# Patient Record
Sex: FEMALE | Race: BLACK OR AFRICAN AMERICAN
[De-identification: names, ages, dates, MRNs, and addresses within clinical notes are randomized per-mention and may not be internally consistent; named-entity substitution may affect disease eponyms.]

---

## 2017-05-26 ENCOUNTER — HOSPITAL ENCOUNTER (EMERGENCY)
Dept: HOSPITAL 62 - ER | Age: 22
Discharge: HOME | End: 2017-05-26
Payer: MEDICAID

## 2017-05-26 VITALS — DIASTOLIC BLOOD PRESSURE: 67 MMHG | SYSTOLIC BLOOD PRESSURE: 110 MMHG

## 2017-05-26 DIAGNOSIS — R51: ICD-10-CM

## 2017-05-26 DIAGNOSIS — Z3A.10: ICD-10-CM

## 2017-05-26 DIAGNOSIS — R07.9: ICD-10-CM

## 2017-05-26 DIAGNOSIS — O21.0: Primary | ICD-10-CM

## 2017-05-26 LAB
ALBUMIN SERPL-MCNC: 4.2 G/DL (ref 3.5–5)
ALP SERPL-CCNC: 99 U/L (ref 38–126)
ALT SERPL-CCNC: 44 U/L (ref 9–52)
ANION GAP SERPL CALC-SCNC: 16 MMOL/L (ref 5–19)
APPEARANCE UR: (no result)
AST SERPL-CCNC: 31 U/L (ref 14–36)
BASOPHILS # BLD AUTO: 0 10^3/UL (ref 0–0.2)
BASOPHILS NFR BLD AUTO: 0.6 % (ref 0–2)
BILIRUB DIRECT SERPL-MCNC: 0.4 MG/DL (ref 0–0.4)
BILIRUB SERPL-MCNC: 0.5 MG/DL (ref 0.2–1.3)
BILIRUB UR QL STRIP: NEGATIVE
BUN SERPL-MCNC: 7 MG/DL (ref 7–20)
CALCIUM: 10.3 MG/DL (ref 8.4–10.2)
CHLORIDE SERPL-SCNC: 102 MMOL/L (ref 98–107)
CO2 SERPL-SCNC: 23 MMOL/L (ref 22–30)
CREAT SERPL-MCNC: 0.64 MG/DL (ref 0.52–1.25)
EOSINOPHIL # BLD AUTO: 0 10^3/UL (ref 0–0.6)
EOSINOPHIL NFR BLD AUTO: 0.4 % (ref 0–6)
ERYTHROCYTE [DISTWIDTH] IN BLOOD BY AUTOMATED COUNT: 13 % (ref 11.5–14)
GLUCOSE SERPL-MCNC: 94 MG/DL (ref 75–110)
GLUCOSE UR STRIP-MCNC: NEGATIVE MG/DL
HCT VFR BLD CALC: 39.7 % (ref 36–47)
HGB BLD-MCNC: 13.2 G/DL (ref 12–15.5)
HGB HCT DIFFERENCE: -0.1
KETONES UR STRIP-MCNC: 20 MG/DL
LIPASE SERPL-CCNC: 254.2 U/L (ref 23–300)
LYMPHOCYTES # BLD AUTO: 1.2 10^3/UL (ref 0.5–4.7)
LYMPHOCYTES NFR BLD AUTO: 19.7 % (ref 13–45)
MCH RBC QN AUTO: 28.6 PG (ref 27–33.4)
MCHC RBC AUTO-ENTMCNC: 33.2 G/DL (ref 32–36)
MCV RBC AUTO: 86 FL (ref 80–97)
MONOCYTES # BLD AUTO: 0.4 10^3/UL (ref 0.1–1.4)
MONOCYTES NFR BLD AUTO: 6.8 % (ref 3–13)
NEUTROPHILS # BLD AUTO: 4.3 10^3/UL (ref 1.7–8.2)
NEUTS SEG NFR BLD AUTO: 72.5 % (ref 42–78)
NITRITE UR QL STRIP: NEGATIVE
PH UR STRIP: 5 [PH] (ref 5–9)
POTASSIUM SERPL-SCNC: 3.9 MMOL/L (ref 3.6–5)
PROT SERPL-MCNC: 8 G/DL (ref 6.3–8.2)
PROT UR STRIP-MCNC: NEGATIVE MG/DL
RBC # BLD AUTO: 4.6 10^6/UL (ref 3.72–5.28)
SODIUM SERPL-SCNC: 140.5 MMOL/L (ref 137–145)
SP GR UR STRIP: 1.02
UROBILINOGEN UR-MCNC: 2 MG/DL (ref ?–2)
WBC # BLD AUTO: 6 10^3/UL (ref 4–10.5)

## 2017-05-26 PROCEDURE — 99285 EMERGENCY DEPT VISIT HI MDM: CPT

## 2017-05-26 PROCEDURE — S0119 ONDANSETRON 4 MG: HCPCS

## 2017-05-26 PROCEDURE — 84702 CHORIONIC GONADOTROPIN TEST: CPT

## 2017-05-26 PROCEDURE — 93010 ELECTROCARDIOGRAM REPORT: CPT

## 2017-05-26 PROCEDURE — 96360 HYDRATION IV INFUSION INIT: CPT

## 2017-05-26 PROCEDURE — 80053 COMPREHEN METABOLIC PANEL: CPT

## 2017-05-26 PROCEDURE — 87086 URINE CULTURE/COLONY COUNT: CPT

## 2017-05-26 PROCEDURE — 36415 COLL VENOUS BLD VENIPUNCTURE: CPT

## 2017-05-26 PROCEDURE — 85025 COMPLETE CBC W/AUTO DIFF WBC: CPT

## 2017-05-26 PROCEDURE — 83690 ASSAY OF LIPASE: CPT

## 2017-05-26 PROCEDURE — 81001 URINALYSIS AUTO W/SCOPE: CPT

## 2017-05-26 PROCEDURE — 93005 ELECTROCARDIOGRAM TRACING: CPT

## 2017-05-26 NOTE — EKG REPORT
SEVERITY:- BORDERLINE ECG -

SINUS TACHYCARDIA

POOR R  PROGRESSION ANTERIOR PRECORDIAL LEADS.

:

Confirmed by: Leander Haji MD 26-May-2017 20:34:14

## 2017-05-26 NOTE — ER DOCUMENT REPORT
ED GI/





- General


Chief Complaint: Chest Pain


Stated Complaint: CHEST PAIN/VOMITING


Time Seen by Provider: 17 14:04


Mode of Arrival: Ambulatory


Notes: 


The patient is a 21-year-old female,  10 weeks pregnant, presents with 

daily nausea and vomiting.  She is trying Phenergan, Reglan and MagOxide 

without much relief of her symptoms.  She is now having a dull mild headache 

and a burning in her substernal region.  Denies hematemesis, fevers, diarrhea, 

abdominal pain, leakage of fluid, vaginal bleeding, constipation or rash





- Related Data


Allergies/Adverse Reactions: 


 





No Known Allergies Allergy (Unverified 17 13:28)


 











Past Medical History





- General


Information source: Patient





- Social History


Smoking Status: Never Smoker


Chew tobacco use (# tins/day): No


Frequency of alcohol use: None


Drug Abuse: None


Family History: Reviewed & Not Pertinent


Patient has suicidal ideation: No


Patient has homicidal ideation: No


Renal/ Medical History: Denies: Hx Peritoneal Dialysis


Surgical Hx: Negative





- Immunizations


Hx Diphtheria, Pertussis, Tetanus Vaccination: No - unknown





Review of Systems





- Review of Systems


Notes: 


REVIEW OF SYSTEMS:


CONSTITUTIONAL: -fevers, -chills


EENT: -eye pain, -difficulty swallowing, -nasal congestion


CARDIOVASCULAR:-chest pain, -syncope.


RESPIRATORY: -cough, -SOB


GASTROINTESTINAL:  -abdominal pain, +nausea, +vomiting, -diarrhea


GENITOURINARY: -dysuria, -hematuria


MUSCULOSKELETAL: -back pain, -neck pain


SKIN: -rash or skin lesions.


HEMATOLOGIC: -easy bruising or bleeding.


LYMPHATIC: -swollen, enlarged glands.


NEUROLOGICAL: -altered mental status or loss of consciousness, -headache, -

neurologic symptoms


PSYCHIATRIC: -anxiety, -depression.


ALL OTHER SYSTEMS REVIEWED AND NEGATIVE.





Physical Exam





- Vital signs


Vitals: 


 











Temp Pulse Resp BP Pulse Ox


 


 98.5 F   108 H  16   124/85   97 


 


 17 13:26  17 13:26  17 13:26  17 13:17 13:26














- Notes


Notes: 


PHYSICAL EXAMINATION:





GENERAL: Well-appearing, well-nourished and in no acute distress.





HEAD: Atraumatic, normocephalic.





EYES: Pupils equal round and reactive to light, extraocular movements intact, 

sclera anicteric, conjunctiva are normal.





ENT: nares patent, oropharynx clear without exudates.  Moist mucous membranes.





NECK: Normal range of motion, supple without lymphadenopathy





LUNGS: Breath sounds clear to auscultation bilaterally and equal.  No wheezes 

rales or rhonchi.





HEART: Regular rate and rhythm without murmurs





ABDOMEN: Soft, nontender, normoactive bowel sounds.  No guarding, no rebound.  

No masses appreciated.





EXTREMITIES: Normal range of motion, no pitting or edema.  No cyanosis.





NEUROLOGICAL: Cranial nerves grossly intact.  Normal speech, normal gait.  

Normal sensory and motor exams.





PSYCH: Normal mood, normal affect.





SKIN: Warm, Dry, normal turgor, no rashes or lesions noted.





Course





- Re-evaluation


Re-evalutation: 


Patient appears well.  Has a small amount of ketones in urine, but her BMP is 

completely normal. No increased anion gap.  His abdomen is soft and nontender.  

Her tachycardia resolved with fluids.  EKG did not show any active ischemia. Pt 

does not appear to have Boerhavve's (appears well and no current chest pain, ACS

, PE (no SOB or hypoxia and resolution of tachycardia with fluids) or 

pneumothorax. Will add doxylamine and have her follow-up with her OB.





- Vital Signs


Vital signs: 


 











Temp Pulse Resp BP Pulse Ox


 


 98.5 F   108 H  16   124/85   97 


 


 17 13:26  17 13:26  17 13:26  17 13:26  17 13:26














- Laboratory


Result Diagrams: 


 17 14:20





 17 14:20


Laboratory results interpreted by me: 


 











  17





  14:20 14:20


 


Calcium  10.3 H 


 


Urine Ketones   20 H


 


Urine Urobilinogen   2.0 H


 


Urine Ascorbic Acid   40 H














- EKG Interpretation by Me


EKG shows normal: Sinus rhythm, Axis, Intervals, QRS Complexes, ST-T Waves


Rate: Tachycardia - 101





Discharge





- Discharge


Clinical Impression: 


 Hyperemesis gravidarum





Condition: Stable


Disposition: HOME, SELF-CARE


Additional Instructions: 


VOMITING:





     Vomiting (or nausea without vomiting) can be caused by many other 

different problems. It can mean that something's wrong with the stomach, such 

as ulcers or inflammation or the intestinal tract, such as appendicitis.  But 

it can also be a symptom of a problem that has nothing to do with the stomach 

or intestines. Vomiting is common with severe headaches, earaches, tonsillitis, 

and kidney infections, etc. We see it with pneumonia or heart attacks. Drugs 

can cause nausea and vomiting. Many abdominal problems cause vomiting; for 

example, gallstones, kidney stones, pancreatitis, and intestinal obstruction (

blocked bowels).


     In most cases, curing the vomiting depends on fixing the problem that 

caused it. For temporary relief, we may use an anti-nausea medicine. For home 

use, we can prescribe suppositories, chewable pills, pills that dissolve in the 

mouth, or liquid anti-nausea drugs. If the vomiting seems to be caused by a 

problem in the stomach, acid-suppressing drugs may be prescribed as well.


     It's important to avoid dehydration. Sip small amounts of clear liquids (

soft drinks, tea, broth, etc) . Try to take fluids frequently even if you are 

vomiting to prevent dehydration.  Take increasing amounts of fluid and when 

liquids are being consumed successfully, advance to small amounts of bland food 

(toast, soups, mashed potatoes, etc.) until you are able to resume a regular 

diet.  Avoid aspirin, tobacco, and alcohol.


     If the vomiting worsens, if the problem that's making you vomit worsens, 

or if there's evidence of bleeding in the stomach (such as black, tarry stool, 

or bloody or black vomit), you should return immediately. Also, return if 

abdominal pain worsens or becomes localized to one area or you develop high 

fever.  Call your doctor if you aren't improved in 24 hours.





INTRAVENOUS (I V) FLUIDS:


     As part of your care today, you received intravenous (IV) fluids.  IV 

fluids are administered to patients who are dehydrated or to those who have 

certain chemical (electrolyte) abnormalities that need correcting.








[place] MEDICATION:


     You have been given a medication to suppress nausea and vomiting. This 

type of medication can be given as a shot, pill, or suppository. It will 

usually last for many hours.  Pills and shots usually last six to eight hours.  

For the typical illness, only one or two doses of the medication may be 

necessary.


     Mild lightheadedness may occur.  This type of medicine can cause 

drowsiness.  Do not drive or operate dangerous machinery while under its 

influence.  Do not mix with alcohol.


     See your doctor at once if you have muscle spasms or tightness, or 

uncontrollable motions (particularly of the neck, mouth, or jaw). Persistent 

vomiting or severe lightheadedness should also be evaluated by the physician.








REGLAN (METOCLOPRAMIDE):


     Reglan has been prescribed.  This medicine affects the stomach and 

intestines.  It can be used to treat nausea and vomiting, to prevent reflux of 

stomach acid up into the esophagus, or to increase the contractions of the 

stomach and intestines.  It is often prescribed for esophagitis, and for 

paralysis of the stomach in diabetics.


     Reglan can cause either mild restlessness or drowsiness.  You should 

contact the doctor at once if you become extremely restless, anxious, or cannot 

sleep, or if you develop uncontrollable motions of the lips, tongue, or jaw.


     Do not take alcohol with this medicine.  Do not drive or operate machinery 

until you have been taking this medicine long enough to know how it affects you.


     Call the doctor if you develop abdominal pains, lightheadedness, black 

stool, or blood in the stool or vomitus.





FOLLOW-UP CARE:


If you have been referred to a physician for follow-up care, call the physician

s office for an appointment as you were instructed or within the next two days.

  If you experience worsening or a significant change in your symptoms, notify 

the physician immediately or return to the Emergency Department at any time for 

re-evaluation.


Prescriptions: 


Doxycycline Hyclate 100 mg PO BID #30 capsule

## 2017-05-26 NOTE — ER DOCUMENT REPORT
ED Medical Screen (RME)





- General


Chief Complaint: Chest Pain


Stated Complaint: CHEST PAIN/VOMITING


Time Seen by Provider: 17 14:04


Mode of Arrival: Ambulatory


Information source: Patient





- HPI


Patient complains to provider of: nausea and vomiting, pregnant


Onset: Last week


Onset/Duration: Persistent


Quality of pain: Achy


Severity: Mild


Pain Level: 1


Associated Symptoms: Nausea, Vomiting


Exacerbated by: Food


Relieved by: Denies


Similar symptoms previously: Yes


Recently seen / treated by doctor: Yes


Notes: 





17 14:45


Patient is a 21-year-old female  who is currently approximately 10 weeks 

pregnant, who presents to the emergency room complaining of nausea and vomiting 

that been occurring daily, she has been prescribed both Phenergan and Reglan 

but the vomiting has persisted, states anytime she tries to eat something it 

comes right back up, she was having some chest pain earlier today but that has 

completely resolved, she denies a fever, no urinary symptoms, no vaginal 

bleeding and no pelvic pain





- Related Data


Allergies/Adverse Reactions: 


 





No Known Allergies Allergy (Unverified 17 13:28)


 











Past Medical History





- Social History


Chew tobacco use (# tins/day): No


Frequency of alcohol use: None


Drug Abuse: None


Renal/ Medical History: Denies: Hx Peritoneal Dialysis


Surgical Hx: Negative





- Immunizations


Hx Diphtheria, Pertussis, Tetanus Vaccination: No - unknown





Physical Exam





- Vital signs


Vitals: 


 











Temp Pulse Resp BP Pulse Ox


 


 98.5 F   108 H  16   124/85   97 


 


 17 13:26  17 13:26  17 13:26  17 13:26  17 13:26














Course





- Vital Signs


Vital signs: 


 











Temp Pulse Resp BP Pulse Ox


 


 98.5 F   108 H  16   124/85   97 


 


 17 13:26  17 13:26  17 13:26  17 13:26  17 13:26














- Laboratory


Result Diagrams: 


 17 14:20





 17 14:20

## 2017-05-31 ENCOUNTER — HOSPITAL ENCOUNTER (EMERGENCY)
Dept: HOSPITAL 62 - ER | Age: 22
Discharge: HOME | End: 2017-05-31
Payer: MEDICAID

## 2017-05-31 VITALS — DIASTOLIC BLOOD PRESSURE: 67 MMHG | SYSTOLIC BLOOD PRESSURE: 118 MMHG

## 2017-05-31 DIAGNOSIS — Z3A.00: ICD-10-CM

## 2017-05-31 DIAGNOSIS — O26.899: ICD-10-CM

## 2017-05-31 DIAGNOSIS — O21.0: Primary | ICD-10-CM

## 2017-05-31 DIAGNOSIS — R51: ICD-10-CM

## 2017-05-31 LAB
ANION GAP SERPL CALC-SCNC: 13 MMOL/L (ref 5–19)
APPEARANCE UR: CLEAR
BASOPHILS # BLD AUTO: 0 10^3/UL (ref 0–0.2)
BASOPHILS NFR BLD AUTO: 0.8 % (ref 0–2)
BILIRUB UR QL STRIP: NEGATIVE
BUN SERPL-MCNC: 8 MG/DL (ref 7–20)
CALCIUM: 10.6 MG/DL (ref 8.4–10.2)
CHLORIDE SERPL-SCNC: 104 MMOL/L (ref 98–107)
CO2 SERPL-SCNC: 21 MMOL/L (ref 22–30)
CREAT SERPL-MCNC: 0.55 MG/DL (ref 0.52–1.25)
EOSINOPHIL # BLD AUTO: 0 10^3/UL (ref 0–0.6)
EOSINOPHIL NFR BLD AUTO: 0.3 % (ref 0–6)
ERYTHROCYTE [DISTWIDTH] IN BLOOD BY AUTOMATED COUNT: 12.6 % (ref 11.5–14)
GLUCOSE SERPL-MCNC: 100 MG/DL (ref 75–110)
GLUCOSE UR STRIP-MCNC: >=500 MG/DL
HCT VFR BLD CALC: 39.5 % (ref 36–47)
HGB BLD-MCNC: 13.3 G/DL (ref 12–15.5)
HGB HCT DIFFERENCE: 0.4
KETONES UR STRIP-MCNC: 80 MG/DL
LYMPHOCYTES # BLD AUTO: 1.1 10^3/UL (ref 0.5–4.7)
LYMPHOCYTES NFR BLD AUTO: 20.9 % (ref 13–45)
MCH RBC QN AUTO: 28.7 PG (ref 27–33.4)
MCHC RBC AUTO-ENTMCNC: 33.8 G/DL (ref 32–36)
MCV RBC AUTO: 85 FL (ref 80–97)
MONOCYTES # BLD AUTO: 0.4 10^3/UL (ref 0.1–1.4)
MONOCYTES NFR BLD AUTO: 7.7 % (ref 3–13)
NEUTROPHILS # BLD AUTO: 3.8 10^3/UL (ref 1.7–8.2)
NEUTS SEG NFR BLD AUTO: 70.3 % (ref 42–78)
NITRITE UR QL STRIP: NEGATIVE
PH UR STRIP: 6 [PH] (ref 5–9)
POTASSIUM SERPL-SCNC: 4 MMOL/L (ref 3.6–5)
PROT UR STRIP-MCNC: NEGATIVE MG/DL
RBC # BLD AUTO: 4.64 10^6/UL (ref 3.72–5.28)
SODIUM SERPL-SCNC: 138.4 MMOL/L (ref 137–145)
SP GR UR STRIP: 1.01
UROBILINOGEN UR-MCNC: NEGATIVE MG/DL (ref ?–2)
WBC # BLD AUTO: 5.5 10^3/UL (ref 4–10.5)

## 2017-05-31 PROCEDURE — 85025 COMPLETE CBC W/AUTO DIFF WBC: CPT

## 2017-05-31 PROCEDURE — 99284 EMERGENCY DEPT VISIT MOD MDM: CPT

## 2017-05-31 PROCEDURE — 36415 COLL VENOUS BLD VENIPUNCTURE: CPT

## 2017-05-31 PROCEDURE — 96375 TX/PRO/DX INJ NEW DRUG ADDON: CPT

## 2017-05-31 PROCEDURE — 96361 HYDRATE IV INFUSION ADD-ON: CPT

## 2017-05-31 PROCEDURE — 81001 URINALYSIS AUTO W/SCOPE: CPT

## 2017-05-31 PROCEDURE — 96374 THER/PROPH/DIAG INJ IV PUSH: CPT

## 2017-05-31 PROCEDURE — 80048 BASIC METABOLIC PNL TOTAL CA: CPT

## 2017-05-31 NOTE — ER DOCUMENT REPORT
ED General





- General


Mode of Arrival: Ambulatory


Information source: Patient


TRAVEL OUTSIDE OF THE [place] IN LAST 30 DAYS: No





- HPI


Onset: Other


Similar symptoms previously: No


Recently seen / treated by doctor: No





<LOU CARY - Last Filed: 17 10:59>





<DANY GALEAS - Last Filed: 17 16:23>





- General


Chief Complaint: Headache


Stated Complaint: HEADACHE/VOMITING/SHORTNESS OF BREATH


Time Seen by Provider: 17 10:10


Notes: 


Patient is a 21 year old pregnant female presenting to the emergency department 

for nausea and vomiting. Patient is 10-11 weeks pregnant and patient is . 

[facility]. Patient 

was seen on 17 for these symptoms and states she was feeling better for 

about 1 day after being discharged and then she had symptoms start back again. 

Patient has nausea, vomiting, and a right sided temporal headache. Patient was 

trying Reglan, Phenergan and mag citrate at home with no relief.  Patient 

denies any fevers, chills, abdominal pain, dysuria, or vaginal bleeding. Patient

's last ate pizza yesterday and had a normal bowel movement yesterday as well. 

Patient's next OB appointment is on 17. Patient has no known medical 

allergies. (LOU CARY)





- Related Data


Allergies/Adverse Reactions: 


 





No Known Allergies Allergy (Unverified 17 10:00)


 








Home Medications: 


 Current Home Medications





Magnesium Oxide [Mag-Ox 400 mg Tablet] 400 mg PO DAILY 17 [History]


Metoclopramide HCl [Reglan 10 mg Tablet] 10 mg PO Q6H 17 [History]


Pnv7/Fe Asp Gly/Docusate/FA [Vinate Pn Care Tablet] 1 each PO DAILY 17 [

History]











Past Medical History





- General


Information source: Patient





- Social History


Smoking Status: Never Smoker


Cigarette use (# per day): No


Chew tobacco use (# tins/day): No


Frequency of alcohol use: None


Drug Abuse: None


Family History: None


Patient has suicidal ideation: No


Patient has homicidal ideation: No





- Medical History


Medical History: Negative


Surgical Hx: Negative





- Immunizations


Hx Diphtheria, Pertussis, Tetanus Vaccination: No - unknown





<LOU CARY - Last Filed: 17 10:59>





Review of Systems





- Review of Systems


Constitutional: No symptoms reported


EENT: No symptoms reported


Cardiovascular: No symptoms reported


Respiratory: No symptoms reported


Gastrointestinal: See HPI, Nausea, Vomiting


Genitourinary: No symptoms reported


Female Genitourinary: See HPI, Pregnant


Musculoskeletal: No symptoms reported


Skin: No symptoms reported


Hematologic/Lymphatic: No symptoms reported


Neurological/Psychological: See HPI, Headaches


-: Yes All other systems reviewed and negative





<LOU CARY - Last Filed: 17 10:59>





Physical Exam





- Vital signs


Interpretation: Normal





- General


General appearance: Appears well, Alert


In distress: Mild





- HEENT


Head: Normocephalic, Atraumatic


Eyes: Normal


Pupils: PERRL


Mucous membranes: Moist





- Respiratory


Respiratory status: No respiratory distress


Chest status: Nontender


Breath sounds: Normal


Chest palpation: Normal





- Cardiovascular


Rhythm: Regular


Heart sounds: Normal auscultation


Murmur: No





- Abdominal


Inspection: Gravid female


Distension: No distension


Bowel sounds: Normal


Tenderness: Nontender


Organomegaly: No organomegaly





- Back


Back: Normal, Nontender





- Extremities


General upper extremity: Normal inspection, Normal ROM, Normal strength


General lower extremity: Normal inspection, Normal ROM, Normal strength





- Neurological


Neuro grossly intact: Yes


Cognition: Normal


Orientation: AAOx4


[place] Coma Scale Eye Opening: Spontaneous


[place] Coma Scale Verbal: Oriented


[place] Coma Scale Motor: Obeys Commands


[place] Coma Scale Total: 15


Speech: Normal





- Psychological


Associated symptoms: Normal affect, Normal mood





- Skin


Skin Temperature: Warm


Skin Moisture: Dry





<BRENNAMILLALOU - Last Filed: 17 10:59>





<DANY GALEAS - Last Filed: 17 16:23>





- Vital signs


Vitals: 


 











Temp Pulse Resp BP Pulse Ox


 


 98.3 F   104 H  18   120/87 H  96 


 


 17 09:55  17 09:55  17 09:55  17 09:55  17 09:55














Course





<LOU CARY - Last Filed: 17 10:59>





- Laboratory


Result Diagrams: 


 17 10:45





 17 10:45





<DANY GALEAS - Last Filed: 17 16:23>





- Re-evaluation


Re-evalutation: 


17 14:21


Patient is actively vomiting liquid at this time.





17 16:19


The patient's nausea was improved quite a bit with Reglan and Benadryl.  She 

previously had reported that Reglan did not seem to help.  She has never tried 

taking it with Benadryl.


He was given a printout of the diplegia's dosing instructions, and written 

instructions on how to make a similar generic version with over-the-counter 

medications. (DANY GALEAS)





- Vital Signs


Vital signs: 


 











Temp Pulse Resp BP Pulse Ox


 


 98.3 F   76   16   112/78   100 


 


 17 09:55  17 11:35  17 11:35  17 14:29  17 14:30














- Laboratory


Laboratory results interpreted by me: 


 











  17





  10:45 14:00


 


Carbon Dioxide  21 L 


 


Calcium  10.6 H 


 


Urine Glucose (UA)   >=500 H


 


Urine Ketones   80 H














Discharge





<LOU CARY - Last Filed: 17 10:59>





<DANY GALEAS - Last Filed: 17 16:23>





- Discharge


Clinical Impression: 


 Hyperemesis gravidarum





Condition: Stable


Disposition: HOME, SELF-CARE


Additional Instructions: 


Hyperemesis Gravidarum:





     Hyperemesis gravidarum is the medical term for severe vomiting during 

pregnancy.  We don't know exactly why it occurs, but it's a common problem.


     Dehydration can occur.  This reduces blood flow to the placenta, 

decreasing the baby's nourishment.  The baby will also become dehydrated.  

There can be harmful changes in blood sodium, potassium, or acid balance.


     Our goal is to correct, and prevent, dehydration.  For severe cases, we 

give IV fluids.  Antinausea medication will be prescribed. (Don't be concerned 

about "birth defects" -- the risk to you and your baby from the hyperemesis is 

the biggest problem.  The antinausea medication is very safe at this stage of 

pregnancy.)


     Call the doctor if you have vaginal bleeding, abdominal pain, severe 

lightheadedness or weakness, or other alarming symptoms.








TAKE THE REGLAN WITH A BENADRYL TABLET.


DRINK SMALL SIPS OF COOL CLEAR LIQUIDS.


TRY THE OTC DICLEGIS AS SHOWN.


FOLLOW UP WITH [facility] OR THE HEALTH DEPARTMENT IF NOT 

IMPROVING.





RETURN TO THE EMERGENCY ROOM IF ANY NEW OR WORSENING SYMPTOMS.








Prescriptions: 


Metoclopramide HCl [Reglan 10 mg Tablet] 10 mg PO Q4 PRN #25 tablet


 PRN Reason: For Nausea/Vomiting


Scribe Attestation: 





17 16:23


I personally performed the services described in the documentation, reviewed 

and edited the documentation which was dictated to the scribe in my presence, 

and it accurately records my words and actions. (DANY GALEAS)





Scribe Documentation





- Scribe


Written by Monik:: Monik Chavez, 17 10:58


acting as scribe for :: Mabel





<LOU CARY - Last Filed: 17 10:59>

## 2017-05-31 NOTE — ER DOCUMENT REPORT
ED Medical Screen (RME)





- General


Chief Complaint: Headache


Stated Complaint: HEADACHE/VOMITING/SHORTNESS OF BREATH


Time Seen by Provider: 17 10:10


Mode of Arrival: Ambulatory


Information source: Patient


Notes: 


This is a 21-year-old female  at 10-11 WGA who presents with persistent 

nausea and vomiting.  She also has a mild right-sided temporal headache today.  

No fevers or chills.  No abdominal pain.  No dysuria.  No vaginal bleeding.  

She was seen about 5 days ago with similar symptoms and states that she was 

better for a day but now she is having difficulty with the vomiting again.  She 

last ate yesterday (pizza).  Normal BM yesterday.  Next OB appt .  She has 

had an US this pregnancy.








I have greeted and performed a rapid initial assessment of this patient.  A 

comprehensive ED assessment and evaluation of the patient, analysis of test 

results and completion of the medical decision making process will be conducted 

by additional ED providers.


TRAVEL OUTSIDE OF THE [place] IN LAST 30 DAYS: No





- Related Data


Allergies/Adverse Reactions: 


 





No Known Allergies Allergy (Unverified 17 10:00)


 








Home Medications: 


 Current Home Medications





Magnesium Oxide [Mag-Ox 400 mg Tablet] 400 mg PO DAILY 17 [History]


Metoclopramide HCl [Reglan 10 mg Tablet] 10 mg PO Q6H 17 [History]


Pnv7/Fe Asp Gly/Docusate/FA [Vinate Pn Care Tablet] 1 each PO DAILY 17 [

History]











Past Medical History


Renal/ Medical History: Denies: Hx Peritoneal Dialysis





- Immunizations


Hx Diphtheria, Pertussis, Tetanus Vaccination: No - unknown





Physical Exam





- Vital signs


Vitals: 





 











Temp Pulse Resp BP Pulse Ox


 


 98.3 F   104 H  18   120/87 H  96 


 


 17 09:55  17 09:55  17 09:55  17 09:55  17 09:55














- General


General appearance: Appears well


In distress: None





- Respiratory


Respiratory status: No respiratory distress


Breath sounds: Normal.  No: Rales, Rhonchi, Wheezing





- Cardiovascular


Rhythm: Tachycardia


Heart sounds: Normal auscultation, S1 appreciated, S2 appreciated





Course





- Vital Signs


Vital signs: 





 











Temp Pulse Resp BP Pulse Ox


 


 98.3 F   104 H  18   120/87 H  96 


 


 17 09:55  17 09:55  17 09:55  17 09:55  17 09:55

## 2017-06-12 ENCOUNTER — HOSPITAL ENCOUNTER (EMERGENCY)
Dept: HOSPITAL 62 - ER | Age: 22
Discharge: HOME | End: 2017-06-12
Payer: MEDICAID

## 2017-06-12 VITALS — DIASTOLIC BLOOD PRESSURE: 78 MMHG | SYSTOLIC BLOOD PRESSURE: 131 MMHG

## 2017-06-12 DIAGNOSIS — O21.9: Primary | ICD-10-CM

## 2017-06-12 DIAGNOSIS — Z79.899: ICD-10-CM

## 2017-06-12 DIAGNOSIS — Z3A.00: ICD-10-CM

## 2017-06-12 DIAGNOSIS — O26.899: ICD-10-CM

## 2017-06-12 DIAGNOSIS — R07.89: ICD-10-CM

## 2017-06-12 LAB
ALBUMIN SERPL-MCNC: 4.6 G/DL (ref 3.5–5)
ALP SERPL-CCNC: 115 U/L (ref 38–126)
ALT SERPL-CCNC: 42 U/L (ref 9–52)
ANION GAP SERPL CALC-SCNC: 17 MMOL/L (ref 5–19)
APPEARANCE UR: (no result)
AST SERPL-CCNC: 36 U/L (ref 14–36)
BASOPHILS # BLD AUTO: 0 10^3/UL (ref 0–0.2)
BASOPHILS NFR BLD AUTO: 0.4 % (ref 0–2)
BILIRUB DIRECT SERPL-MCNC: 0.6 MG/DL (ref 0–0.4)
BILIRUB SERPL-MCNC: 1.1 MG/DL (ref 0.2–1.3)
BILIRUB UR QL STRIP: NEGATIVE
BUN SERPL-MCNC: 7 MG/DL (ref 7–20)
CALCIUM: 10.7 MG/DL (ref 8.4–10.2)
CHLORIDE SERPL-SCNC: 100 MMOL/L (ref 98–107)
CO2 SERPL-SCNC: 21 MMOL/L (ref 22–30)
CREAT SERPL-MCNC: 0.62 MG/DL (ref 0.52–1.25)
EOSINOPHIL # BLD AUTO: 0 10^3/UL (ref 0–0.6)
EOSINOPHIL NFR BLD AUTO: 0.1 % (ref 0–6)
ERYTHROCYTE [DISTWIDTH] IN BLOOD BY AUTOMATED COUNT: 12.9 % (ref 11.5–14)
GLUCOSE SERPL-MCNC: 106 MG/DL (ref 75–110)
GLUCOSE UR STRIP-MCNC: NEGATIVE MG/DL
HCT VFR BLD CALC: 40.2 % (ref 36–47)
HGB BLD-MCNC: 13.8 G/DL (ref 12–15.5)
HGB HCT DIFFERENCE: 1.2
KETONES UR STRIP-MCNC: 80 MG/DL
LIPASE SERPL-CCNC: 155.5 U/L (ref 23–300)
LYMPHOCYTES # BLD AUTO: 0.9 10^3/UL (ref 0.5–4.7)
LYMPHOCYTES NFR BLD AUTO: 13.5 % (ref 13–45)
MCH RBC QN AUTO: 29.1 PG (ref 27–33.4)
MCHC RBC AUTO-ENTMCNC: 34.2 G/DL (ref 32–36)
MCV RBC AUTO: 85 FL (ref 80–97)
MONOCYTES # BLD AUTO: 0.5 10^3/UL (ref 0.1–1.4)
MONOCYTES NFR BLD AUTO: 8 % (ref 3–13)
NEUTROPHILS # BLD AUTO: 5 10^3/UL (ref 1.7–8.2)
NEUTS SEG NFR BLD AUTO: 78 % (ref 42–78)
NITRITE UR QL STRIP: NEGATIVE
PH UR STRIP: 6 [PH] (ref 5–9)
POTASSIUM SERPL-SCNC: 3.9 MMOL/L (ref 3.6–5)
PROT SERPL-MCNC: 8.9 G/DL (ref 6.3–8.2)
PROT UR STRIP-MCNC: 30 MG/DL
RBC # BLD AUTO: 4.73 10^6/UL (ref 3.72–5.28)
SODIUM SERPL-SCNC: 137.8 MMOL/L (ref 137–145)
SP GR UR STRIP: 1.02
UROBILINOGEN UR-MCNC: 4 MG/DL (ref ?–2)
WBC # BLD AUTO: 6.4 10^3/UL (ref 4–10.5)

## 2017-06-12 PROCEDURE — 80053 COMPREHEN METABOLIC PANEL: CPT

## 2017-06-12 PROCEDURE — 85025 COMPLETE CBC W/AUTO DIFF WBC: CPT

## 2017-06-12 PROCEDURE — 96361 HYDRATE IV INFUSION ADD-ON: CPT

## 2017-06-12 PROCEDURE — 96374 THER/PROPH/DIAG INJ IV PUSH: CPT

## 2017-06-12 PROCEDURE — 81001 URINALYSIS AUTO W/SCOPE: CPT

## 2017-06-12 PROCEDURE — 84703 CHORIONIC GONADOTROPIN ASSAY: CPT

## 2017-06-12 PROCEDURE — 93010 ELECTROCARDIOGRAM REPORT: CPT

## 2017-06-12 PROCEDURE — 36415 COLL VENOUS BLD VENIPUNCTURE: CPT

## 2017-06-12 PROCEDURE — 99285 EMERGENCY DEPT VISIT HI MDM: CPT

## 2017-06-12 PROCEDURE — 84484 ASSAY OF TROPONIN QUANT: CPT

## 2017-06-12 PROCEDURE — 83690 ASSAY OF LIPASE: CPT

## 2017-06-12 PROCEDURE — 93005 ELECTROCARDIOGRAM TRACING: CPT

## 2017-06-12 NOTE — ER DOCUMENT REPORT
ED Medical Screen (RME)





- General


Chief Complaint: Chest Pain


Stated Complaint: CHEST PAIN/NAUSEA


Time Seen by Provider: 06/12/17 15:16


Mode of Arrival: Ambulatory


Information source: Patient


TRAVEL OUTSIDE OF THE [place] IN LAST 30 DAYS: No





- HPI


Patient complains to provider of: Chest pain, epigastric abdominal pain, nausea 

and vomiting


Onset: Yesterday


Onset/Duration: Persistent


Quality of pain: Sharp, Stabbing


Severity: Moderate


Pain Level: 4


Associated Symptoms: Abdominal pain, Chest pain, Nausea, Vomiting


Exacerbated by: Denies


Relieved by: Denies


Similar symptoms previously: Yes


Recently seen / treated by doctor: Yes


Notes: 





06/12/17 15:18


Patient is a 21-year-old female who is currently 14 weeks pregnant, who 

presents to the emergency room complaining of epigastric abdominal pain, chest 

pain, nausea and vomiting 2 days, no diarrhea, no fever chills, no pelvic 

cramping or vaginal bleeding





- Related Data


Allergies/Adverse Reactions: 


 





No Known Allergies Allergy (Verified 06/12/17 15:16)


 











Past Medical History


Renal/ Medical History: Denies: Hx Peritoneal Dialysis





- Immunizations


Hx Diphtheria, Pertussis, Tetanus Vaccination: No - unknown





Physical Exam





- Vital signs


Vitals: 





 











Temp Pulse Resp BP Pulse Ox


 


 98.6 F   88   16   129/81 H  100 


 


 06/12/17 15:13  06/12/17 15:13  06/12/17 15:13  06/12/17 15:13  06/12/17 15:13














Course





- Vital Signs


Vital signs: 





 











Temp Pulse Resp BP Pulse Ox


 


 98.6 F   88   16   129/81 H  100 


 


 06/12/17 15:13  06/12/17 15:13  06/12/17 15:13  06/12/17 15:13  06/12/17 15:13

## 2017-06-12 NOTE — ER DOCUMENT REPORT
ED General





- General


Chief Complaint: Chest Pain


Stated Complaint: CHEST PAIN/NAUSEA


Time Seen by Provider: 17 15:16


Mode of Arrival: Ambulatory


TRAVEL OUTSIDE OF THE [place] IN LAST 30 DAYS: No





- HPI


Patient complains to provider of: Chest pain nausea vomiting


Notes: 


Is  coming in for nausea vomiting chest wall pain.  Patient states chest 

pain occurring at the following up.  Patient has multiple times of vomitus 

today.  Patient states she is currently on Unisom and B6 with no relief.  

Patient states she has not tried anything else patient currently follows up 

with wound care clinic.  Patient otherwise has no other complaints upon my 

evaluation.





- Related Data


Allergies/Adverse Reactions: 


 





No Known Allergies Allergy (Verified 17 15:16)


 











Past Medical History





- General


Information source: Patient





- Social History


Smoking Status: Never Smoker


Chew tobacco use (# tins/day): No


Frequency of alcohol use: None


Drug Abuse: None


Family History: None


Patient has suicidal ideation: No


Patient has homicidal ideation: No


Renal/ Medical History: Denies: Hx Peritoneal Dialysis





- Immunizations


Hx Diphtheria, Pertussis, Tetanus Vaccination: No - unknown





Review of Systems





- Review of Systems


Constitutional: No symptoms reported


EENT: No symptoms reported


Cardiovascular: Chest pain


Respiratory: No symptoms reported


Gastrointestinal: Nausea, Vomiting


Genitourinary: No symptoms reported


Female Genitourinary: No symptoms reported


Musculoskeletal: No symptoms reported


Skin: No symptoms reported


Hematologic/Lymphatic: No symptoms reported


Neurological/Psychological: No symptoms reported


-: Yes All other systems reviewed and negative





Physical Exam





- Vital signs


Vitals: 


 











Temp Pulse Resp BP Pulse Ox


 


 98.6 F   88   16   129/81 H  100 


 


 17 15:13  17 15:13  17 15:13  17 15:13  17 15:13











Interpretation: Normal





- General


General appearance: Appears well, Alert





- HEENT


Head: Normocephalic, Atraumatic


Eyes: Normal


Pupils: PERRL





- Respiratory


Respiratory status: No respiratory distress


Chest status: Nontender


Breath sounds: Normal


Chest palpation: Normal





- Cardiovascular


Rhythm: Regular


Heart sounds: Normal auscultation


Murmur: No





- Abdominal


Inspection: Normal, Gravid female


Distension: No distension


Bowel sounds: Normal


Tenderness: Nontender


Organomegaly: No organomegaly





- Back


Back: Normal, Nontender





- Extremities


General upper extremity: Normal inspection, Nontender, Normal color, Normal ROM

, Normal temperature


General lower extremity: Normal inspection, Nontender, Normal color, Normal ROM

, Normal temperature, Normal weight bearing.  No: Shady's sign





- Neurological


Neuro grossly intact: Yes


Cognition: Normal


Orientation: AAOx4


[place] Coma Scale Eye Opening: Spontaneous


[place] Coma Scale Verbal: Oriented


Tez Coma Scale Motor: Obeys Commands


Tez Coma Scale Total: 15


Speech: Normal


Motor strength normal: LUE, RUE, LLE, RLE


Sensory: Normal





- Psychological


Associated symptoms: Normal affect, Normal mood





- Skin


Skin Temperature: Warm


Skin Moisture: Dry


Skin Color: Normal





Course





- Re-evaluation


Re-evalutation: 





17 19:04


Patient coming in for evaluation of chest pain nausea vomiting.  Patient chest 

pain resolved with GI cocktail.  EKG and blood work did not show any critical 

pathology.  Patient has bedside ultrasound performed showing a fetal heart rate 

of 160.  Patient was given IV fluids here.  Educated patient about other over-

the-counter nausea medications given a prescription for Reglan.  Patient was 

discharged home.





- Vital Signs


Vital signs: 


 











Temp Pulse Resp BP Pulse Ox


 


 98.6 F   88   16   129/81 H  100 


 


 17 15:13  17 15:13  17 15:13  17 15:13  17 15:13














- Laboratory


Result Diagrams: 


 17 15:36





 17 15:36


Laboratory results interpreted by me: 


 











  17





  15:36 15:36 17:09


 


Carbon Dioxide  21 L  


 


Calcium  10.7 H  


 


Direct Bilirubin  0.6 H  


 


Total Protein  8.9 H  


 


Serum HCG, Qual   POSITIVE H 


 


Urine Protein    30 H


 


Urine Ketones    80 H


 


Urine Urobilinogen    4.0 H














Discharge





- Discharge


Clinical Impression: 


 Nausea/vomiting in pregnancy





Condition: Good


Disposition: HOME, SELF-CARE


Instructions:  Vomiting ([facility])


Additional Instructions: 


. 





Your laboratory studies reveal no critical pathology today.  Please return to 

the ER if symptoms worsen.  Follow-up with your primary care physician.  Take 

medication as prescribed.





You have been seen for vomiting during pregnancy.  You should continue to drink 

plenty of water and consider taking a solution such as Pedialyte if your having 

difficulty eating food.  Please return if you become unable to drink any fluids 

for more than 12 hours, urinate less than twice a day, pass out, or have any 

other symptoms that are concerning to you.


                                                                               

                                


For nausea and vomiting during pregnancy I recomment:


Start with 10-12.5 mg of pyridoxine (vitamin B6) three times a day for 2 days. 

If not fully effective,


Increase to 12.5 mg of pyridoxine four times a day for 2 days. If not fully 

effective,


Increase to 25 mg of pyridoxine three times a day for 2 days. If not fully 

effective,


Continue 25 mg pyridoxine 3 times a day, and add 12.5 mg of doxylamine before 

bedtime each day for 2 days. If not fully effective,


Continue 25 mg pyridoxine 3 times a day, and take 12.5 mg of doxylamine twice a 

day. If not fully effective,


Continue 25 mg pyridoxine 3 times a day, and take 12.5 mg of doxylamine three 

times a day. If not fully effective,


Continue 25 mg pyridoxine 3 times a day, and 12.5 mg of doxylamine 3 times a day

, while adding Emetrol, one to two tablespoons (15-30 cc) taken once or twice a 

day as needed. (Emetrol is an over-the-counter mixture of sugar syrups and 

phosphoric acid [phosphorylated carbohydrate solution]) that acts by soothing 

the actual wall of the gastrointestinal tract). If not fully effective,


Consult with your doctor.


Prescriptions: 


Metoclopramide HCl [Reglan] 5 mg PO Q6 #20 tablet


Forms:  Return to Work

## 2017-06-13 ENCOUNTER — HOSPITAL ENCOUNTER (EMERGENCY)
Dept: HOSPITAL 62 - ER | Age: 22
Discharge: LEFT BEFORE DISCHARGE | End: 2017-06-13
Payer: MEDICAID

## 2017-06-13 VITALS — DIASTOLIC BLOOD PRESSURE: 78 MMHG | SYSTOLIC BLOOD PRESSURE: 126 MMHG

## 2017-06-13 DIAGNOSIS — Z53.21: Primary | ICD-10-CM

## 2017-06-13 NOTE — EKG REPORT
SEVERITY:- BORDERLINE ECG -

SINUS OR ECTOPIC ATRIAL RHYTHM

SHORT [place] INTERVAL, ACCELERATED AV CONDUCTION

:

Confirmed by: Anahi De Guzman 13-Jun-2017 00:15:16

## 2017-06-16 ENCOUNTER — HOSPITAL ENCOUNTER (OUTPATIENT)
Dept: HOSPITAL 62 - ER | Age: 22
Setting detail: OBSERVATION
LOS: 2 days | Discharge: HOME | End: 2017-06-18
Attending: OBSTETRICS & GYNECOLOGY | Admitting: OBSTETRICS & GYNECOLOGY
Payer: MEDICAID

## 2017-06-16 DIAGNOSIS — O21.0: Primary | ICD-10-CM

## 2017-06-16 DIAGNOSIS — K21.9: ICD-10-CM

## 2017-06-16 DIAGNOSIS — K22.8: ICD-10-CM

## 2017-06-16 DIAGNOSIS — O99.611: ICD-10-CM

## 2017-06-16 DIAGNOSIS — Z3A.14: ICD-10-CM

## 2017-06-16 DIAGNOSIS — Z79.899: ICD-10-CM

## 2017-06-16 LAB
ALBUMIN SERPL-MCNC: 4.2 G/DL (ref 3.5–5)
ALP SERPL-CCNC: 115 U/L (ref 38–126)
ALT SERPL-CCNC: 44 U/L (ref 9–52)
ANION GAP SERPL CALC-SCNC: 19 MMOL/L (ref 5–19)
AST SERPL-CCNC: 32 U/L (ref 14–36)
BASOPHILS # BLD AUTO: 0 10^3/UL (ref 0–0.2)
BASOPHILS NFR BLD AUTO: 0.4 % (ref 0–2)
BILIRUB DIRECT SERPL-MCNC: 0.5 MG/DL (ref 0–0.4)
BILIRUB SERPL-MCNC: 0.8 MG/DL (ref 0.2–1.3)
BUN SERPL-MCNC: 4 MG/DL (ref 7–20)
CALCIUM: 10.1 MG/DL (ref 8.4–10.2)
CHLORIDE SERPL-SCNC: 103 MMOL/L (ref 98–107)
CO2 SERPL-SCNC: 16 MMOL/L (ref 22–30)
CREAT SERPL-MCNC: 0.47 MG/DL (ref 0.52–1.25)
EOSINOPHIL # BLD AUTO: 0 10^3/UL (ref 0–0.6)
EOSINOPHIL NFR BLD AUTO: 0.1 % (ref 0–6)
ERYTHROCYTE [DISTWIDTH] IN BLOOD BY AUTOMATED COUNT: 12.7 % (ref 11.5–14)
GLUCOSE SERPL-MCNC: 80 MG/DL (ref 75–110)
HCT VFR BLD CALC: 38.3 % (ref 36–47)
HGB BLD-MCNC: 12.7 G/DL (ref 12–15.5)
HGB HCT DIFFERENCE: -0.2
LYMPHOCYTES # BLD AUTO: 0.9 10^3/UL (ref 0.5–4.7)
LYMPHOCYTES NFR BLD AUTO: 13 % (ref 13–45)
MCH RBC QN AUTO: 28.5 PG (ref 27–33.4)
MCHC RBC AUTO-ENTMCNC: 33.3 G/DL (ref 32–36)
MCV RBC AUTO: 86 FL (ref 80–97)
MONOCYTES # BLD AUTO: 0.4 10^3/UL (ref 0.1–1.4)
MONOCYTES NFR BLD AUTO: 5.7 % (ref 3–13)
NEUTROPHILS # BLD AUTO: 5.6 10^3/UL (ref 1.7–8.2)
NEUTS SEG NFR BLD AUTO: 80.8 % (ref 42–78)
POTASSIUM SERPL-SCNC: 4.1 MMOL/L (ref 3.6–5)
PROT SERPL-MCNC: 8.3 G/DL (ref 6.3–8.2)
RBC # BLD AUTO: 4.47 10^6/UL (ref 3.72–5.28)
SODIUM SERPL-SCNC: 138 MMOL/L (ref 137–145)
WBC # BLD AUTO: 6.9 10^3/UL (ref 4–10.5)

## 2017-06-16 PROCEDURE — 85025 COMPLETE CBC W/AUTO DIFF WBC: CPT

## 2017-06-16 PROCEDURE — 93010 ELECTROCARDIOGRAM REPORT: CPT

## 2017-06-16 PROCEDURE — 80053 COMPREHEN METABOLIC PANEL: CPT

## 2017-06-16 PROCEDURE — 84443 ASSAY THYROID STIM HORMONE: CPT

## 2017-06-16 PROCEDURE — S0028 INJECTION, FAMOTIDINE, 20 MG: HCPCS

## 2017-06-16 PROCEDURE — 99291 CRITICAL CARE FIRST HOUR: CPT

## 2017-06-16 PROCEDURE — 96361 HYDRATE IV INFUSION ADD-ON: CPT

## 2017-06-16 PROCEDURE — 93005 ELECTROCARDIOGRAM TRACING: CPT

## 2017-06-16 PROCEDURE — 81001 URINALYSIS AUTO W/SCOPE: CPT

## 2017-06-16 PROCEDURE — 36415 COLL VENOUS BLD VENIPUNCTURE: CPT

## 2017-06-16 PROCEDURE — 96366 THER/PROPH/DIAG IV INF ADDON: CPT

## 2017-06-16 PROCEDURE — 83690 ASSAY OF LIPASE: CPT

## 2017-06-16 PROCEDURE — 96365 THER/PROPH/DIAG IV INF INIT: CPT

## 2017-06-16 PROCEDURE — 96375 TX/PRO/DX INJ NEW DRUG ADDON: CPT

## 2017-06-16 PROCEDURE — 84484 ASSAY OF TROPONIN QUANT: CPT

## 2017-06-16 PROCEDURE — G0378 HOSPITAL OBSERVATION PER HR: HCPCS

## 2017-06-16 NOTE — ER DOCUMENT REPORT
ED Medical Screen (RME)





- General


Chief Complaint: Nausea/Vomiting


Stated Complaint: CHEST PAIN


Time Seen by Provider: 06/16/17 21:50


Notes: 


21 year old female, 14 weeks pregnant, comes by EMS for chief complaint of 

vomiting and chest pain, states she has been evaluated multiple times for this (

also in [place]). States she is on "4 meds" but unable to tell me which. 

Denies fever, hematemesis, vaginal bleeding, lower abdominal pain. Pending 

appointment at [facility]. 


TRAVEL OUTSIDE OF THE [place] IN LAST 30 DAYS: No





- Related Data


Allergies/Adverse Reactions: 


 





No Known Allergies Allergy (Verified 06/12/17 15:16)


 











Past Medical History


Renal/ Medical History: Denies: Hx Peritoneal Dialysis





- Immunizations


Hx Diphtheria, Pertussis, Tetanus Vaccination: No - unknown





Physical Exam





- Vital signs


Vitals: 





 











Temp Pulse Resp BP Pulse Ox


 


 98.8 F   97   20   129/85 H  99 


 


 06/16/17 19:55  06/16/17 19:55  06/16/17 19:55  06/16/17 19:55  06/16/17 19:55














- Respiratory


Respiratory status: No respiratory distress


Breath sounds: Normal.  No: Decreased air movement, Wheezing





- Cardiovascular


Rhythm: Regular.  No: Tachycardia


Heart sounds: Normal auscultation, S1 appreciated, S2 appreciated





Course





- Vital Signs


Vital signs: 





 











Temp Pulse Resp BP Pulse Ox


 


 98.8 F   97   20   129/85 H  99 


 


 06/16/17 19:55  06/16/17 19:55  06/16/17 19:55  06/16/17 19:55  06/16/17 19:55

## 2017-06-16 NOTE — ER DOCUMENT REPORT
ED GI/





- General


Chief Complaint: Nausea/Vomiting


Stated Complaint: CHEST PAIN


Time Seen by Provider: 06/16/17 21:50


Notes: 


21 year old female, 14 weeks pregnant, comes by EMS for chief complaint of 

vomiting and chest pain, states she has been evaluated multiple times for this (

also in [place]). States she is on "4 meds" but unable to tell me which. 

Denies fever, hematemesis, vaginal bleeding, lower abdominal pain. Pending 

appointment at [facility]. 


TRAVEL OUTSIDE OF THE [place] IN LAST 30 DAYS: No





- Related Data


Allergies/Adverse Reactions: 


 





No Known Allergies Allergy (Verified 06/12/17 15:16)


 











Past Medical History





- Social History


Family History: None


Patient has suicidal ideation: No


Patient has homicidal ideation: No


Renal/ Medical History: Denies: Hx Peritoneal Dialysis





- Immunizations


Hx Diphtheria, Pertussis, Tetanus Vaccination: No - unknown





Physical Exam





- Vital signs


Vitals: 





 











Temp Pulse Resp BP Pulse Ox


 


 98.8 F   97   20   129/85 H  99 


 


 06/16/17 19:55  06/16/17 19:55  06/16/17 19:55  06/16/17 19:55  06/16/17 19:55














- Respiratory


Respiratory status: No respiratory distress


Breath sounds: Normal.  No: Decreased air movement, Wheezing





- Cardiovascular


Rhythm: Regular.  No: Tachycardia


Heart sounds: Normal auscultation, S1 appreciated, S2 appreciated





Course





- Vital Signs


Vital signs: 





 











Temp Pulse Resp BP Pulse Ox


 


 98.8 F   97   20   129/85 H  99 


 


 06/16/17 19:55  06/16/17 19:55  06/16/17 19:55  06/16/17 19:55  06/16/17 19:55

## 2017-06-16 NOTE — EKG REPORT
SEVERITY:- BORDERLINE ECG -

SINUS OR ECTOPIC ATRIAL RHYTHM

SHORT [place] INTERVAL, ACCELERATED AV CONDUCTION

:

Confirmed by: Anahi De Guzman 16-Jun-2017 23:41:38

## 2017-06-17 LAB
APPEARANCE UR: (no result)
BILIRUB UR QL STRIP: NEGATIVE
GLUCOSE UR STRIP-MCNC: NEGATIVE MG/DL
KETONES UR STRIP-MCNC: 80 MG/DL
NITRITE UR QL STRIP: NEGATIVE
PH UR STRIP: 6 [PH] (ref 5–9)
PROT UR STRIP-MCNC: 30 MG/DL
SP GR UR STRIP: 1.03
UROBILINOGEN UR-MCNC: 2 MG/DL (ref ?–2)

## 2017-06-17 RX ADMIN — LANSOPRAZOLE SCH MG: 30 TABLET, ORALLY DISINTEGRATING, DELAYED RELEASE ORAL at 07:20

## 2017-06-17 RX ADMIN — DICYCLOMINE HYDROCHLORIDE SCH MG: 20 TABLET ORAL at 21:20

## 2017-06-17 RX ADMIN — METOCLOPRAMIDE HYDROCHLORIDE SCH MG: 10 TABLET ORAL at 21:20

## 2017-06-17 RX ADMIN — Medication SCH MG: at 12:53

## 2017-06-17 RX ADMIN — Medication SCH MG: at 08:40

## 2017-06-17 RX ADMIN — DEXAMETHASONE SODIUM PHOSPHATE SCH MG: 10 INJECTION INTRAMUSCULAR; INTRAVENOUS at 07:19

## 2017-06-17 RX ADMIN — Medication SCH MG: at 17:02

## 2017-06-17 RX ADMIN — DEXTROSE, SODIUM CHLORIDE, AND POTASSIUM CHLORIDE PRN ML: 5; .45; .15 INJECTION INTRAVENOUS at 07:10

## 2017-06-17 RX ADMIN — DEXTROSE, SODIUM CHLORIDE, AND POTASSIUM CHLORIDE PRN ML: 5; .45; .15 INJECTION INTRAVENOUS at 14:32

## 2017-06-17 RX ADMIN — METOCLOPRAMIDE HYDROCHLORIDE SCH MG: 10 TABLET ORAL at 11:16

## 2017-06-17 RX ADMIN — Medication SCH MG: at 23:08

## 2017-06-17 RX ADMIN — DEXAMETHASONE SODIUM PHOSPHATE SCH MG: 10 INJECTION INTRAMUSCULAR; INTRAVENOUS at 13:11

## 2017-06-17 RX ADMIN — DICYCLOMINE HYDROCHLORIDE SCH MG: 20 TABLET ORAL at 15:12

## 2017-06-17 RX ADMIN — METOCLOPRAMIDE HYDROCHLORIDE SCH MG: 10 TABLET ORAL at 15:12

## 2017-06-17 RX ADMIN — DEXTROSE, SODIUM CHLORIDE, AND POTASSIUM CHLORIDE PRN ML: 5; .45; .15 INJECTION INTRAVENOUS at 23:10

## 2017-06-17 RX ADMIN — DEXAMETHASONE SODIUM PHOSPHATE SCH MG: 10 INJECTION INTRAMUSCULAR; INTRAVENOUS at 21:20

## 2017-06-17 RX ADMIN — METOCLOPRAMIDE HYDROCHLORIDE SCH MG: 10 TABLET ORAL at 09:45

## 2017-06-18 VITALS — SYSTOLIC BLOOD PRESSURE: 111 MMHG | DIASTOLIC BLOOD PRESSURE: 68 MMHG

## 2017-06-18 LAB
ALBUMIN SERPL-MCNC: 3.2 G/DL (ref 3.5–5)
ALP SERPL-CCNC: 88 U/L (ref 38–126)
ALT SERPL-CCNC: 36 U/L (ref 9–52)
ANION GAP SERPL CALC-SCNC: 10 MMOL/L (ref 5–19)
AST SERPL-CCNC: 24 U/L (ref 14–36)
BILIRUB DIRECT SERPL-MCNC: 0.4 MG/DL (ref 0–0.4)
BILIRUB SERPL-MCNC: 0.6 MG/DL (ref 0.2–1.3)
BUN SERPL-MCNC: < 2 MG/DL (ref 7–20)
CALCIUM: 9.2 MG/DL (ref 8.4–10.2)
CHLORIDE SERPL-SCNC: 105 MMOL/L (ref 98–107)
CO2 SERPL-SCNC: 22 MMOL/L (ref 22–30)
CREAT SERPL-MCNC: 0.53 MG/DL (ref 0.52–1.25)
GLUCOSE SERPL-MCNC: 117 MG/DL (ref 75–110)
POTASSIUM SERPL-SCNC: 3.8 MMOL/L (ref 3.6–5)
PROT SERPL-MCNC: 6.5 G/DL (ref 6.3–8.2)
SODIUM SERPL-SCNC: 136.7 MMOL/L (ref 137–145)

## 2017-06-18 RX ADMIN — Medication SCH MG: at 06:15

## 2017-06-18 RX ADMIN — DICYCLOMINE HYDROCHLORIDE SCH MG: 20 TABLET ORAL at 08:20

## 2017-06-18 RX ADMIN — DICYCLOMINE HYDROCHLORIDE SCH MG: 20 TABLET ORAL at 04:07

## 2017-06-18 RX ADMIN — DEXAMETHASONE SODIUM PHOSPHATE SCH MG: 10 INJECTION INTRAMUSCULAR; INTRAVENOUS at 06:15

## 2017-06-18 RX ADMIN — METOCLOPRAMIDE HYDROCHLORIDE SCH MG: 10 TABLET ORAL at 08:20

## 2017-06-18 RX ADMIN — DEXTROSE, SODIUM CHLORIDE, AND POTASSIUM CHLORIDE PRN ML: 5; .45; .15 INJECTION INTRAVENOUS at 07:18

## 2017-06-18 RX ADMIN — LANSOPRAZOLE SCH MG: 30 TABLET, ORALLY DISINTEGRATING, DELAYED RELEASE ORAL at 06:15

## 2017-06-18 NOTE — PDOC DISCHARGE SUMMARY
Final Diagnosis





- Final Diagnosis








(2) Hyperemesis gravidarum


Is this a current diagnosis for this admission?: Yes








Discharge Data





- Discharge Medication


Home Medications: 








Magnesium Oxide [Mag-Ox 400 mg Tablet] 400 mg PO DAILY 05/31/17 


Metoclopramide HCl [Reglan 10 mg Tablet] 10 mg PO Q6H 05/31/17 


Pnv7/Fe Asp Gly/Docusate/FA [Vinate Pn Care Tablet] 1 each PO DAILY 05/31/17 


Metoclopramide HCl [Reglan] 5 mg PO Q6 #20 tablet 06/12/17 


Dicyclomine HCl [Bentyl 20 mg Tablet] 20 mg PO Q6A #60 tablet 06/18/17 


Lansoprazole [Prevacid 30 mg Odt Tablet] 30 mg PO Q6AM #30 tab.rap.dr 06/18/17 


Metoclopramide HCl [Reglan 10 mg Tablet] 10 mg PO ACHS #30 tablet 06/18/17 


Pnv W-O Ca No5/Fe Fumarate/FA [Prenatal-U Multiple Vitamin Capsule] 1 cap PO 

DAILY #0 capsule 06/18/17 


Pyridoxine HCl [Vitamin B-6 Tablet 50 mg] 25 mg PO Q6 #60 tablet 06/18/17 








Admission Note: 


hyperemesis





- Diagnosis Test


Laboratory: 


 











Temp Pulse Resp BP Pulse Ox


 


 98.8 F   81   16   111/68   98 


 


 06/18/17 08:00  06/18/17 08:00  06/18/17 08:00  06/18/17 08:00  06/18/17 08:00














- Discharge information/Instructions


Discharge Activity: Activity As Tolerated


Discharge Diet: As Tolerated


Disposition: HOME, SELF-CARE


Follow up with: [facility]


in: 1, Weeks

## 2017-06-18 NOTE — PDOC PROGRESS REPORT
Subjective-OB


Subjective: 


Post Delivery Day:








21 year old.  Denies any needs at this time 


Wants to go home, can't sleep and back hurts in bed, no nausea, feeling better 

and thinks she will be better off at home,





Physical Exam (OB)


Vital Signs: 


 











Temp Pulse Resp BP Pulse Ox


 


 98.8 F   81   16   111/68   98 


 


 06/18/17 08:00  06/18/17 08:00  06/18/17 08:00  06/18/17 08:00  06/18/17 08:00








 Intake & Output











 06/17/17 06/18/17 06/19/17





 06:59 06:59 06:59


 


Intake Total  1840 


 


Output Total  4250 


 


Balance  -2410 














- Abdomen


Hernia Present: No





Objective-Diagnostic


Laboratory: 


 





 06/18/17 06:09 





 











  06/18/17 06/18/17





  06:09 06:09


 


Sodium  136.7 L 


 


Potassium  3.8 


 


Chloride  105 


 


Carbon Dioxide  22 


 


Anion Gap  10 


 


BUN  < 2 L 


 


Creatinine  0.53 


 


Est GFR ( Amer)  > 60 


 


Est GFR (Non-Af Amer)  > 60 


 


Glucose  117 H 


 


Calcium  9.2 


 


Total Bilirubin  0.6 


 


AST  24 


 


ALT  36 


 


Alkaline Phosphatase  88 


 


Total Protein  6.5 


 


Albumin  3.2 L 


 


TSH   1.03














Assessment and Plan(PN)





- Assessment and Plan


(1) Nausea/vomiting in pregnancy


Is this a current diagnosis for this admission?: Yes





(2) Hyperemesis gravidarum


Is this a current diagnosis for this admission?: Yes








- Time Spent with Patient


Time with patient: Less than 15 minutes


Medications reviewed and adjusted accordingly: Yes





- Disposition


Anticipated Discharge: Home


Within: Other - home today

## 2017-06-23 ENCOUNTER — HOSPITAL ENCOUNTER (INPATIENT)
Dept: HOSPITAL 62 - 2S | Age: 22
LOS: 8 days | Discharge: LEFT BEFORE DISCHARGE | DRG: 781 | End: 2017-07-01
Attending: OBSTETRICS & GYNECOLOGY | Admitting: OBSTETRICS & GYNECOLOGY
Payer: MEDICAID

## 2017-06-23 DIAGNOSIS — E87.6: ICD-10-CM

## 2017-06-23 DIAGNOSIS — K85.10: ICD-10-CM

## 2017-06-23 DIAGNOSIS — O13.2: ICD-10-CM

## 2017-06-23 DIAGNOSIS — O26.892: ICD-10-CM

## 2017-06-23 DIAGNOSIS — O14.92: ICD-10-CM

## 2017-06-23 DIAGNOSIS — Z3A.15: ICD-10-CM

## 2017-06-23 DIAGNOSIS — E86.0: ICD-10-CM

## 2017-06-23 DIAGNOSIS — O21.0: Primary | ICD-10-CM

## 2017-06-23 DIAGNOSIS — O99.212: ICD-10-CM

## 2017-06-23 LAB
ALBUMIN SERPL-MCNC: 4.5 G/DL (ref 3.5–5)
ALP SERPL-CCNC: 148 U/L (ref 38–126)
ALT SERPL-CCNC: 98 U/L (ref 9–52)
AMYLASE SERPL-CCNC: 320 U/L (ref 30–110)
ANION GAP SERPL CALC-SCNC: 19 MMOL/L (ref 5–19)
APPEARANCE UR: (no result)
AST SERPL-CCNC: 61 U/L (ref 14–36)
BACTERIA #/AREA URNS HPF: (no result) /HPF
BASOPHILS NFR BLD MANUAL: 1 % (ref 0–2)
BILIRUB DIRECT SERPL-MCNC: 0.6 MG/DL (ref 0–0.4)
BILIRUB SERPL-MCNC: 0.9 MG/DL (ref 0.2–1.3)
BILIRUB UR QL STRIP: NEGATIVE
BUN SERPL-MCNC: 5 MG/DL (ref 7–20)
CALCIUM: 10.5 MG/DL (ref 8.4–10.2)
CHLORIDE SERPL-SCNC: 104 MMOL/L (ref 98–107)
CO2 SERPL-SCNC: 14 MMOL/L (ref 22–30)
CREAT SERPL-MCNC: 0.58 MG/DL (ref 0.52–1.25)
EOSINOPHIL NFR BLD MANUAL: 0 % (ref 0–6)
ERYTHROCYTE [DISTWIDTH] IN BLOOD BY AUTOMATED COUNT: 12.8 % (ref 11.5–14)
GLUCOSE SERPL-MCNC: 85 MG/DL (ref 75–110)
GLUCOSE UR STRIP-MCNC: NEGATIVE MG/DL
HCT VFR BLD CALC: 41.2 % (ref 36–47)
HGB BLD-MCNC: 14 G/DL (ref 12–15.5)
HGB HCT DIFFERENCE: 0.8
KETONES UR STRIP-MCNC: 80 MG/DL
LIPASE SERPL-CCNC: 857.7 U/L (ref 23–300)
MCH RBC QN AUTO: 29 PG (ref 27–33.4)
MCHC RBC AUTO-ENTMCNC: 34 G/DL (ref 32–36)
MCV RBC AUTO: 85 FL (ref 80–97)
NITRITE UR QL STRIP: NEGATIVE
PH UR STRIP: 6 [PH] (ref 5–9)
POTASSIUM SERPL-SCNC: 4.3 MMOL/L (ref 3.6–5)
PROT SERPL-MCNC: 8.6 G/DL (ref 6.3–8.2)
PROT UR STRIP-MCNC: 100 MG/DL
RBC # BLD AUTO: 4.84 10^6/UL (ref 3.72–5.28)
RBC MORPH BLD: (no result)
SODIUM SERPL-SCNC: 137.3 MMOL/L (ref 137–145)
SP GR UR STRIP: 1.02
T3FREE SERPL-MCNC: 2.97 PG/ML (ref 2.77–5.27)
TOTAL CELLS COUNTED BLD: 100
TSH SERPL-ACNC: 0.53 UIU/ML (ref 0.47–4.68)
UROBILINOGEN UR-MCNC: NEGATIVE MG/DL (ref ?–2)
VARIANT LYMPHS NFR BLD MANUAL: 20 % (ref 13–45)
WBC # BLD AUTO: 6 10^3/UL (ref 4–10.5)
WBC #/AREA URNS HPF: (no result) /HPF

## 2017-06-23 PROCEDURE — 82570 ASSAY OF URINE CREATININE: CPT

## 2017-06-23 PROCEDURE — 93976 VASCULAR STUDY: CPT

## 2017-06-23 PROCEDURE — 36569 INSJ PICC 5 YR+ W/O IMAGING: CPT

## 2017-06-23 PROCEDURE — 81001 URINALYSIS AUTO W/SCOPE: CPT

## 2017-06-23 PROCEDURE — 85025 COMPLETE CBC W/AUTO DIFF WBC: CPT

## 2017-06-23 PROCEDURE — 83690 ASSAY OF LIPASE: CPT

## 2017-06-23 PROCEDURE — 83735 ASSAY OF MAGNESIUM: CPT

## 2017-06-23 PROCEDURE — 76937 US GUIDE VASCULAR ACCESS: CPT

## 2017-06-23 PROCEDURE — 84550 ASSAY OF BLOOD/URIC ACID: CPT

## 2017-06-23 PROCEDURE — 77001 FLUOROGUIDE FOR VEIN DEVICE: CPT

## 2017-06-23 PROCEDURE — 84439 ASSAY OF FREE THYROXINE: CPT

## 2017-06-23 PROCEDURE — 83615 LACTATE (LD) (LDH) ENZYME: CPT

## 2017-06-23 PROCEDURE — 82962 GLUCOSE BLOOD TEST: CPT

## 2017-06-23 PROCEDURE — 82150 ASSAY OF AMYLASE: CPT

## 2017-06-23 PROCEDURE — 84481 FREE ASSAY (FT-3): CPT

## 2017-06-23 PROCEDURE — 83036 HEMOGLOBIN GLYCOSYLATED A1C: CPT

## 2017-06-23 PROCEDURE — 80053 COMPREHEN METABOLIC PANEL: CPT

## 2017-06-23 PROCEDURE — 87086 URINE CULTURE/COLONY COUNT: CPT

## 2017-06-23 PROCEDURE — 84156 ASSAY OF PROTEIN URINE: CPT

## 2017-06-23 PROCEDURE — 84134 ASSAY OF PREALBUMIN: CPT

## 2017-06-23 PROCEDURE — 36415 COLL VENOUS BLD VENIPUNCTURE: CPT

## 2017-06-23 PROCEDURE — 84443 ASSAY THYROID STIM HORMONE: CPT

## 2017-06-23 PROCEDURE — 84478 ASSAY OF TRIGLYCERIDES: CPT

## 2017-06-23 PROCEDURE — G0378 HOSPITAL OBSERVATION PER HR: HCPCS

## 2017-06-23 PROCEDURE — 84100 ASSAY OF PHOSPHORUS: CPT

## 2017-06-23 PROCEDURE — 76705 ECHO EXAM OF ABDOMEN: CPT

## 2017-06-23 PROCEDURE — G0379 DIRECT REFER HOSPITAL OBSERV: HCPCS

## 2017-06-23 RX ADMIN — FAMOTIDINE SCH MG: 20 TABLET, FILM COATED ORAL at 21:25

## 2017-06-23 NOTE — CONSULTATION REPORT E
Consultation Report



NAME: RYANNE PABLO

MRN:  [id number]               : 1995      AGE: 21Y

DATE: 2017     228  A



TO:   ESTER ARAUJO M.D.



FROM: NIMESH BARON M.D.

      Requesting Physician



REASON FOR CONSULTATION:

Patient with vomiting and noted elevated lipase and sludge in the

gallbladder.



HISTORY OF PRESENT ILLNESS:

This is a 21-year-old female who has been having some mild chest

discomfort radiating to the epigastric area for about a month.  Last night

complained of vomiting and then went to the primary care physician who

noted tenderness in her epigastric area and sent to [facility].  She

had blood work done and noted to have elevated lipase of about greater

than 800 and LFTs slightly elevated.  Her white count, however, was

normal.



PAST MEDICAL HISTORY:

History of pregnancy, first time about 5 years ago and had a normal

delivery then.  She is about 15 weeks pregnant at this time.  No other

past medical history.



REVIEW OF SYSTEMS:

As in HPI, complaining of epigastric pains with nausea and vomiting.  It

makes her have some mild chest discomfort radiating to the epigastric area

for about a month now.  No other symptoms.  All other systems are

unremarkable and are normal.



SOCIAL HISTORY:

Patient denies smoking, drinking or drug use.



ALLERGIES:

None known.



PHYSICAL EXAMINATION:



GENERAL APPEARANCE:  A well-developed, well-nourished 21-year-old female

about 15 weeks pregnant in no apparent acute distress.



HEENT:  Neck is supple, no thyromegaly.



RESPIRATORY:  Lungs are clear.



HEART:  Regular sinus rhythm.



ABDOMEN:  Soft with epigastric tenderness.  No right upper quadrant

tenderness.



EXTREMITIES:  No edema.



IMPRESSION:

1.  Gallstone pancreatitis.

2.  Pregnancy uterine 15 weeks.



PLAN:

Keep her on bowel rest, n.p.o. except for ice chips.  She can have pain

medications as needed.  Repeat all the blood work in the morning and if

they are trending down, we could probably start her back on p.o. liquids

and progress as tolerated.  She will likely need a laparoscopic

cholecystectomy but if she does not have another episode or remains

asymptomatic, it can wait until after the delivery of her baby.







DICTATING PHYSICIAN: ESTER ARAUJO M.D.





[id number]                  DT: 2017    [id number]

[facility]#: [id number]            DD: 2017

ID:   [id number]           JOB#: [id number]      ACCT: [id number]



cc:ESTER ARAUJO M.D.

>

## 2017-06-23 NOTE — PDOC H&P
History of Present Illness


Admission Date/PCP: 


  06/23/17 10:44





  YONATHAN SOW MD





Patient complains of: N/V for the last several weeks.  21lb weight loss at 15 

weeks,  Pt also now c/o epigastric pain and unable to tolerate po intake.


History of Present Illness: 


RYANNE PABLO is a 21 year old female with n/v of pregnancy and [facility] transfer 

at 14 weeks.  She was seen 14+5ega and noted 17# weight loss with multiple ER 

visits.  However pt refused admission at that time.  See today in the office 

with 21lb weight loss at 15 weeks (4# weight los in 3 days).  Pt also now c/o 

epigastric pain and unable to tolerate po intake worsened.  She is on multiple 

medications for n/v of pregnancy none of which are working.








Past Medical History


LMP: 3/10/2017


Gynecological Infection: No


  ** Baby 1


Year: 9


Weeks: 40


Weight: 3.345 kg


Delivery: Spontaneous Vaginal Delivery


Cardiac Medical History: Reports: Hypertension - current pregnancy only


   Denies: Congestive Heart Failure, Myocardial Infarction, Pulmonary Embolism, 

Heart Murmur


Pulmonary Medical History: 


   Denies: Asthma, Bronchitis, Chronic Obstructive Pulmonary Disease (COPD), 

Pneumonia, Sleep Apnea, Tuberculosis


Neurological Medical History: 


   Denies: Seizures


Endocrine Medical History: 


   Denies: Hyperthyroidism, Hypothyroidism


Renal/ Medical History: 


   Denies: End Stage Renal Disease


Malignancy Medical History: 


   Denies: Breast Cancer, Cervical Cancer, Ovarian Cancer


GI Medical History: 


   Denies: Cirrhosis, Gastroesophageal Reflux Disease, Hiatal Hernia


Musculoskeltal Medical History: 


   Denies: Arthritis, Fibromyalgia


Psychiatric Medical History: 


   Denies: Bipolar Disorder, Depression, Post Traumatic Stress Disorder


Infectious Medical History: 


   Denies: HIV





Past Surgical History


Past Surgical History: Reports: None





Social History


Information Source: Patient


Lives with: Family


Smoking Status: Never Smoker


Frequency of Alcohol Use: None


Hx Recreational Drug Use: No


Drugs: None


Hx Prescription Drug Abuse: No





- Advance Directive


Resuscitation Status: Full Code





Family History


Family History: None


Parental Family History Reviewed: No


Children Family History Reviewed: NA


Sibling(s) Family History Reviewed.: NA





Medication/Allergy


Home Medications: 








Magnesium Oxide [Mag-Ox 400 mg Tablet] 400 mg PO DAILY 05/31/17 


Metoclopramide HCl [Reglan 10 mg Tablet] 10 mg PO Q6H 05/31/17 


Pnv7/Fe Asp Gly/Docusate/FA [Vinate Pn Care Tablet] 1 each PO DAILY 05/31/17 


Metoclopramide HCl [Reglan] 5 mg PO Q6 #20 tablet 06/12/17 


Dicyclomine HCl [Bentyl 20 mg Tablet] 20 mg PO Q6A #60 tablet 06/18/17 


Lansoprazole [Prevacid 30 mg Odt Tablet] 30 mg PO Q6AM #30 tab. 06/18/17 


Metoclopramide HCl [Reglan 10 mg Tablet] 10 mg PO ACHS #30 tablet 06/18/17 


Pnv W-O Ca No5/Fe Fumarate/FA [Prenatal-U Multiple Vitamin Capsule] 1 cap PO 

DAILY #0 capsule 06/18/17 


Pyridoxine HCl [Vitamin B-6 Tablet 50 mg] 25 mg PO Q6 #60 tablet 06/18/17 








Allergies/Adverse Reactions: 


 





No Known Allergies Allergy (Verified 06/12/17 15:16)


 











Review of Systems


Constitutional: PRESENT: headache(s), weakness, weight loss


Respiratory: ABSENT: cough, hemoptysis


Gastrointestinal: PRESENT: abdominal pain, heartburn, nausea, vomiting.  ABSENT

: constipation, diarrhea, hematemesis, hematochezia


Genitourinary: ABSENT: dysuria, hematuria


Musculoskeletal: ABSENT: joint swelling


Integumentary: ABSENT: rash, wounds


Neurological: ABSENT: abnormal gait, abnormal speech, confusion, dizziness, 

focal weakness, syncope


Psychiatric: ABSENT: anxiety, depression, homidical ideation, suicidal ideation


Endocrine: ABSENT: cold intolerance, heat intolerance, polydipsia, polyuria


Hematologic/Lymphatic: ABSENT: easy bleeding, easy bruising





Physical Exam





- Physical Exam


Vital Signs: 


 Intake & Output











 06/22/17 06/23/17 06/24/17





 06:59 06:59 06:59


 


Weight   82.1 kg











General appearance: PRESENT: no acute distress, well-developed, well-nourished


Head exam: PRESENT: atraumatic, normocephalic


Respiratory exam: PRESENT: clear to auscultation bayron, symmetrical, unlabored.  

ABSENT: tachypnea, wheezes


Cardiovascular exam: PRESENT: RRR.  ABSENT: diastolic murmur, rubs, systolic 

murmur


Pulses: PRESENT: normal dorsalis pedis pul, +2 pedal pulses bilateral


Vascular exam: PRESENT: normal capillary refill


GI/Abdominal exam: PRESENT: normal bowel sounds, soft.  ABSENT: distended, 

guarding, mass, organolmegaly, rebound, tenderness


Rectal exam: PRESENT: deferred


Extremities exam: PRESENT: full ROM.  ABSENT: calf tenderness, clubbing, pedal 

edema


Neurological exam: PRESENT: alert, awake, oriented to person, oriented to place

, oriented to time, oriented to situation, CN II-XII grossly intact.  ABSENT: 

motor sensory deficit


Psychiatric exam: PRESENT: appropriate affect, normal mood.  ABSENT: homicidal 

ideation, suicidal ideation


Skin exam: PRESENT: dry, intact, warm.  ABSENT: cyanosis, rash





Result


Laboratory Results: 


 





 06/23/17 12:55 





 06/23/17 12:55 





 











  06/23/17 06/23/17





  12:55 12:55


 


WBC  6.0 


 


RBC  4.84 


 


Hgb  14.0 


 


Hct  41.2 


 


MCV  85 


 


MCH  29.0 


 


MCHC  34.0 


 


RDW  12.8 


 


Plt Count  304 


 


Sodium   137.3


 


Potassium   4.3


 


Chloride   104


 


Carbon Dioxide   14 L


 


Anion Gap   19


 


BUN   5 L


 


Creatinine   0.58


 


Est GFR ( Amer)   > 60


 


Est GFR (Non-Af Amer)   > 60


 


Glucose   85


 


Calcium   10.5 H


 


Total Bilirubin   0.9


 


AST   61 H


 


ALT   98 H


 


Alkaline Phosphatase   148 H


 


Total Protein   8.6 H


 


Albumin   4.5


 


Amylase   320 H


 


Lipase   857.7 H











Impressions: 


sludge noted of gallbladder.


Status: Pending





Assessment & Plan





- Diagnosis


(1) Pancreatitis


Qualifiers: 


     Chronicity: acute     Pancreatitis type: other


Is this a current diagnosis for this admission?: YesPlan: 


NPO.  COnsult Gen surgery for assistant with management and recommendations.








(2) Dehydration


Is this a current diagnosis for this admission?: YesPlan: 


Banana Bag and IVF ordered.  NPO due to Hyperemesis and poss pancreatitis








(3) Hyperemesis gravidarum


Is this a current diagnosis for this admission?: YesPlan: 


NPO and meds ordered.  NPO now and plan for NPO until n/v improved











- Time


Time Spent: 30 to 50 Minutes


Critical Time spent with patient: Less than 15 minutes


Medications reviewed and adjusted accordingly: Yes


Anticipated discharge: Home


Within: within 72 hours





- Inpatient Certification


Based on my medical assessment, after consideration of the patient's 

comorbidities, presenting symptoms, or acuity I expect that the services needed 

warrant INPATIENT care.: Yes


I certify that my determination is in accordance with my understanding of 

Medicare's requirements for reasonable and necessary INPATIENT services [42 CFR 

412.3e].: Yes


Medical Necessity: Failure to Improve With Outpatient Therapy, Need Close 

Monitoring Due to Risk of Patient Decompensation, Need For IV Fluids

## 2017-06-23 NOTE — RADIOLOGY REPORT (SQ)
EXAM DESCRIPTION:  U/S ABDOMEN LTD W/DOPPLER



COMPLETED DATE/TIME:  6/23/2017 2:26 pm



REASON FOR STUDY:  R U Q PAIN



COMPARISON:  None.



TECHNIQUE:  Dynamic and static grayscale images acquired of the abdomen and recorded on PACS. Rabiao
marjorie selected color Doppler and spectral images recorded.



LIMITATIONS:  None.



FINDINGS:  PANCREAS: The body appears normal.  Visualization was limited because of bowel gas.

LIVER: 11.8 cm.  Normal echotexture.

LIVER VASCULATURE: Normal directional flow of the main portal vein and hepatic veins.

GALLBLADDER: No stones.  No wall thickening or pericholecystic fluid.  There is questionably some slu
dge in the gallbladder versus artifact.

ULTRASOUND-DETECTED JORGENSEN'S SIGN: Negative.

INTRAHEPATIC DUCTS AND COMMON DUCT: Common bile duct is normal at 3 mm.  There is no intrahepatic delores
cristal dilatation.

INFERIOR VENA CAVA: Normal flow.

AORTA: No aneurysm.

RIGHT KIDNEY:  Normal size, 10.4 cm. Normal echogenicity. No solid or suspicious masses. No hydroneph
rosis. No calcifications.

PERITONEAL AND RIGHT PLEURAL SPACE: No ascites or effusions.

OTHER: No other significant findings.



IMPRESSION:  There may be a some sludge in the gallbladder, the study was otherwise unremarkable.



TECHNICAL DOCUMENTATION:  JOB ID:  [id number]

 2011 [employer]- All Rights Reserved

## 2017-06-24 LAB
ALBUMIN SERPL-MCNC: 3.3 G/DL (ref 3.5–5)
ALBUMIN SERPL-MCNC: 3.5 G/DL (ref 3.5–5)
ALP SERPL-CCNC: 111 U/L (ref 38–126)
ALP SERPL-CCNC: 121 U/L (ref 38–126)
ALT SERPL-CCNC: 78 U/L (ref 9–52)
ALT SERPL-CCNC: 84 U/L (ref 9–52)
AMYLASE SERPL-CCNC: 259 U/L (ref 30–110)
AMYLASE SERPL-CCNC: 298 U/L (ref 30–110)
ANION GAP SERPL CALC-SCNC: 11 MMOL/L (ref 5–19)
ANION GAP SERPL CALC-SCNC: 14 MMOL/L (ref 5–19)
AST SERPL-CCNC: 52 U/L (ref 14–36)
AST SERPL-CCNC: 54 U/L (ref 14–36)
BASOPHILS # BLD AUTO: 0 10^3/UL (ref 0–0.2)
BASOPHILS # BLD AUTO: 0 10^3/UL (ref 0–0.2)
BASOPHILS NFR BLD AUTO: 0.9 % (ref 0–2)
BASOPHILS NFR BLD AUTO: 1.1 % (ref 0–2)
BILIRUB DIRECT SERPL-MCNC: 0.6 MG/DL (ref 0–0.4)
BILIRUB DIRECT SERPL-MCNC: 0.7 MG/DL (ref 0–0.4)
BILIRUB SERPL-MCNC: 1 MG/DL (ref 0.2–1.3)
BILIRUB SERPL-MCNC: 1.1 MG/DL (ref 0.2–1.3)
BUN SERPL-MCNC: 4 MG/DL (ref 7–20)
BUN SERPL-MCNC: < 2 MG/DL (ref 7–20)
CALCIUM: 9 MG/DL (ref 8.4–10.2)
CALCIUM: 9.4 MG/DL (ref 8.4–10.2)
CHLORIDE SERPL-SCNC: 107 MMOL/L (ref 98–107)
CHLORIDE SERPL-SCNC: 109 MMOL/L (ref 98–107)
CO2 SERPL-SCNC: 15 MMOL/L (ref 22–30)
CO2 SERPL-SCNC: 18 MMOL/L (ref 22–30)
CREAT SERPL-MCNC: 0.47 MG/DL (ref 0.52–1.25)
CREAT SERPL-MCNC: 0.53 MG/DL (ref 0.52–1.25)
CREAT UR-MCNC: 142.1 MG/DL (ref 16–327)
EOSINOPHIL # BLD AUTO: 0 10^3/UL (ref 0–0.6)
EOSINOPHIL # BLD AUTO: 0 10^3/UL (ref 0–0.6)
EOSINOPHIL NFR BLD AUTO: 0.3 % (ref 0–6)
EOSINOPHIL NFR BLD AUTO: 0.5 % (ref 0–6)
ERYTHROCYTE [DISTWIDTH] IN BLOOD BY AUTOMATED COUNT: 12.6 % (ref 11.5–14)
ERYTHROCYTE [DISTWIDTH] IN BLOOD BY AUTOMATED COUNT: 12.8 % (ref 11.5–14)
GLUCOSE SERPL-MCNC: 72 MG/DL (ref 75–110)
GLUCOSE SERPL-MCNC: 79 MG/DL (ref 75–110)
HCT VFR BLD CALC: 35.7 % (ref 36–47)
HCT VFR BLD CALC: 36.5 % (ref 36–47)
HGB BLD-MCNC: 12 G/DL (ref 12–15.5)
HGB BLD-MCNC: 12.2 G/DL (ref 12–15.5)
HGB HCT DIFFERENCE: 0.1
HGB HCT DIFFERENCE: 0.3
LDH1 SERPL-CCNC: 287 U/L (ref 313–618)
LIPASE SERPL-CCNC: 819.9 U/L (ref 23–300)
LIPASE SERPL-CCNC: 895.1 U/L (ref 23–300)
LYMPHOCYTES # BLD AUTO: 1.3 10^3/UL (ref 0.5–4.7)
LYMPHOCYTES # BLD AUTO: 1.5 10^3/UL (ref 0.5–4.7)
LYMPHOCYTES NFR BLD AUTO: 27 % (ref 13–45)
LYMPHOCYTES NFR BLD AUTO: 28.5 % (ref 13–45)
MCH RBC QN AUTO: 28.7 PG (ref 27–33.4)
MCH RBC QN AUTO: 29 PG (ref 27–33.4)
MCHC RBC AUTO-ENTMCNC: 33.3 G/DL (ref 32–36)
MCHC RBC AUTO-ENTMCNC: 33.7 G/DL (ref 32–36)
MCV RBC AUTO: 86 FL (ref 80–97)
MCV RBC AUTO: 86 FL (ref 80–97)
MONOCYTES # BLD AUTO: 0.6 10^3/UL (ref 0.1–1.4)
MONOCYTES # BLD AUTO: 0.7 10^3/UL (ref 0.1–1.4)
MONOCYTES NFR BLD AUTO: 12.1 % (ref 3–13)
MONOCYTES NFR BLD AUTO: 12.7 % (ref 3–13)
NEUTROPHILS # BLD AUTO: 2.6 10^3/UL (ref 1.7–8.2)
NEUTROPHILS # BLD AUTO: 3.3 10^3/UL (ref 1.7–8.2)
NEUTS SEG NFR BLD AUTO: 57.2 % (ref 42–78)
NEUTS SEG NFR BLD AUTO: 59.7 % (ref 42–78)
POTASSIUM SERPL-SCNC: 3.1 MMOL/L (ref 3.6–5)
POTASSIUM SERPL-SCNC: 3.7 MMOL/L (ref 3.6–5)
PROT SERPL-MCNC: 6.7 G/DL (ref 6.3–8.2)
PROT SERPL-MCNC: 7.1 G/DL (ref 6.3–8.2)
PROT UR STRIP-MCNC: 55.6 MG/DL (ref ?–12)
RBC # BLD AUTO: 4.14 10^6/UL (ref 3.72–5.28)
RBC # BLD AUTO: 4.23 10^6/UL (ref 3.72–5.28)
SODIUM SERPL-SCNC: 136.2 MMOL/L (ref 137–145)
SODIUM SERPL-SCNC: 138.3 MMOL/L (ref 137–145)
URATE SERPL-MCNC: 5.2 MG/DL (ref 2.5–6.2)
WBC # BLD AUTO: 4.6 10^3/UL (ref 4–10.5)
WBC # BLD AUTO: 5.5 10^3/UL (ref 4–10.5)

## 2017-06-24 RX ADMIN — SODIUM CHLORIDE AND POTASSIUM CHLORIDE PRN ML: 9; 2.98 INJECTION, SOLUTION INTRAVENOUS at 11:39

## 2017-06-24 RX ADMIN — FAMOTIDINE SCH MG: 20 TABLET, FILM COATED ORAL at 22:40

## 2017-06-24 RX ADMIN — FAMOTIDINE SCH MG: 20 TABLET, FILM COATED ORAL at 10:52

## 2017-06-24 NOTE — PDOC PROGRESS REPORT
Subjective


Progress Note for:: 06/24/17


Subjective:: 


Patient reports he is feeling better, no nausea vomiting or pain.  She is 

received nothing for pain.





Physical Exam


Vital Signs: 


 











Temp Pulse Resp BP Pulse Ox


 


 97.5 F   85   15   118/71   99 


 


 06/24/17 08:42  06/24/17 08:42  06/24/17 08:42  06/24/17 08:42  06/24/17 08:42








 Intake & Output











 06/23/17 06/24/17 06/25/17





 06:59 06:59 06:59


 


Output Total  700 


 


Balance  -700 


 


Weight  82.1 kg 











General appearance: PRESENT: no acute distress


GI/Abdominal exam: PRESENT: other - Abdomen is completely benign no peritoneal 

signs no rigidity no guarding no organomegaly.





Results


Laboratory Results: 


 





 06/24/17 05:39 





 06/24/17 05:39 





 











  06/23/17 06/23/17 06/23/17





  12:55 12:55 12:55


 


WBC  6.0  


 


RBC  4.84  


 


Hgb  14.0  


 


Hct  41.2  


 


MCV  85  


 


MCH  29.0  


 


MCHC  34.0  


 


RDW  12.8  


 


Plt Count  304  


 


Seg Neutrophils %  Not Reportable  


 


Lymphocytes %  Not Reportable  


 


Monocytes %  Not Reportable  


 


Eosinophils %  Not Reportable  


 


Basophils %  Not Reportable  


 


Absolute Neutrophils  Not Reportable  


 


Absolute Lymphocytes  Not Reportable  


 


Absolute Monocytes  Not Reportable  


 


Absolute Eosinophils  Not Reportable  


 


Absolute Basophils  Not Reportable  


 


Sodium   137.3 


 


Potassium   4.3 


 


Chloride   104 


 


Carbon Dioxide   14 L 


 


Anion Gap   19 


 


BUN   5 L 


 


Creatinine   0.58 


 


Est GFR ( Amer)   > 60 


 


Est GFR (Non-Af Amer)   > 60 


 


Glucose   85 


 


Uric Acid   


 


Calcium   10.5 H 


 


Total Bilirubin   0.9 


 


AST   61 H 


 


ALT   98 H 


 


Alkaline Phosphatase   148 H 


 


Total Protein   8.6 H 


 


Albumin   4.5 


 


Amylase   320 H 


 


Lipase   857.7 H 


 


TSH    0.53


 


Free T4    1.83


 


Free T3 pg/mL    2.97


 


Urine Color   


 


Urine Appearance   


 


Urine pH   


 


Ur Specific Gravity   


 


Urine Protein   


 


Urine Glucose (UA)   


 


Urine Ketones   


 


Urine Blood   


 


Urine Nitrite   


 


Ur Leukocyte Esterase   


 


Ur Squamous Epith Cells   














  06/23/17 06/23/17 06/24/17





  12:55 16:59 05:39


 


WBC   


 


RBC   


 


Hgb   


 


Hct   


 


MCV   


 


MCH   


 


MCHC   


 


RDW   


 


Plt Count   


 


Seg Neutrophils %   


 


Lymphocytes %   


 


Monocytes %   


 


Eosinophils %   


 


Basophils %   


 


Absolute Neutrophils   


 


Absolute Lymphocytes   


 


Absolute Monocytes   


 


Absolute Eosinophils   


 


Absolute Basophils   


 


Sodium   


 


Potassium   


 


Chloride   


 


Carbon Dioxide   


 


Anion Gap   


 


BUN   


 


Creatinine   


 


Est GFR ( Amer)   


 


Est GFR (Non-Af Amer)   


 


Glucose   


 


Uric Acid  8.0 H  


 


Calcium   


 


Total Bilirubin   


 


AST   


 


ALT   


 


Alkaline Phosphatase   


 


Total Protein   


 


Albumin   


 


Amylase    298 H


 


Lipase    895.1 H


 


TSH   


 


Free T4   


 


Free T3 pg/mL   


 


Urine Color   YELLOW 


 


Urine Appearance   SLIGHTLY-CLOUDY 


 


Urine pH   6.0 


 


Ur Specific Gravity   1.025 


 


Urine Protein   100 H 


 


Urine Glucose (UA)   NEGATIVE 


 


Urine Ketones   80 H 


 


Urine Blood   NEGATIVE 


 


Urine Nitrite   NEGATIVE 


 


Ur Leukocyte Esterase   NEGATIVE 


 


Ur Squamous Epith Cells   FEW 














  06/24/17 06/24/17





  05:39 05:39


 


WBC  5.5 


 


RBC  4.14 


 


Hgb  12.0 


 


Hct  35.7 L 


 


MCV  86 


 


MCH  29.0 


 


MCHC  33.7 


 


RDW  12.8 


 


Plt Count  256 


 


Seg Neutrophils %  59.7 


 


Lymphocytes %  27.0 


 


Monocytes %  12.1 


 


Eosinophils %  0.3 


 


Basophils %  0.9 


 


Absolute Neutrophils  3.3 


 


Absolute Lymphocytes  1.5 


 


Absolute Monocytes  0.7 


 


Absolute Eosinophils  0.0 


 


Absolute Basophils  0.0 


 


Sodium   138.3


 


Potassium   3.7


 


Chloride   109 H


 


Carbon Dioxide   15 L


 


Anion Gap   14


 


BUN   4 L


 


Creatinine   0.53


 


Est GFR ( Amer)   > 60


 


Est GFR (Non-Af Amer)   > 60


 


Glucose   72 L


 


Uric Acid  


 


Calcium   9.4


 


Total Bilirubin   1.1


 


AST   52 H


 


ALT   78 H


 


Alkaline Phosphatase   111


 


Total Protein   6.7


 


Albumin   3.3 L


 


Amylase  


 


Lipase  


 


TSH  


 


Free T4  


 


Free T3 pg/mL  


 


Urine Color  


 


Urine Appearance  


 


Urine pH  


 


Ur Specific Gravity  


 


Urine Protein  


 


Urine Glucose (UA)  


 


Urine Ketones  


 


Urine Blood  


 


Urine Nitrite  


 


Ur Leukocyte Esterase  


 


Ur Squamous Epith Cells  











Impressions: 


 





Abdomen Ultrasound  06/23/17 00:00


IMPRESSION:  There may be a some sludge in the gallbladder, the study was 

otherwise unremarkable.


 














Assessment & Plan





- Diagnosis


(1) Pancreatitis


Qualifiers: 


     Chronicity: acute     Pancreatitis type: other


Is this a current diagnosis for this admission?: YesPlan: 


1.  Clinically improved however lipase level still mildly elevated.  There is 

no immediate indication for surgical intervention.  More significantly however 

is patient's persisting lactic acidosis; the exact etiology remains somewhat 

unclear





Recommendations 











1.  We will step up level of fluid resuscitation with 2 L of normal saline





2.  We will check electrolytes later this evening.





3.  We will keep her on ice chips for now.





Discussed the above with Dr. Pearce, OB/GYN

## 2017-06-24 NOTE — PDOC PROGRESS REPORT
Subjective


Progress Note for:: 06/24/17


Subjective:: 


feeling better.  pain resolved.  last vomiting yesterday.  





Physical Exam





- Physical Exam


Vital Signs: 


 











Temp Pulse Resp BP Pulse Ox


 


 97.5 F   81   14   111/72   100 


 


 06/24/17 11:38  06/24/17 11:38  06/24/17 11:38  06/24/17 11:38  06/24/17 11:38








 Intake & Output











 06/23/17 06/24/17 06/25/17





 06:59 06:59 06:59


 


Output Total  700 


 


Balance  -700 


 


Weight  82.1 kg 











General appearance: PRESENT: no acute distress, cooperative


GI/Abdominal exam: PRESENT: soft - nontender, nondistended.





Result


Laboratory Results: 


 





 06/24/17 05:39 





 06/24/17 05:39 





 











  06/23/17 06/23/17 06/23/17





  12:55 12:55 12:55


 


WBC  6.0  


 


RBC  4.84  


 


Hgb  14.0  


 


Hct  41.2  


 


MCV  85  


 


MCH  29.0  


 


MCHC  34.0  


 


RDW  12.8  


 


Plt Count  304  


 


Seg Neutrophils %  Not Reportable  


 


Lymphocytes %  Not Reportable  


 


Monocytes %  Not Reportable  


 


Eosinophils %  Not Reportable  


 


Basophils %  Not Reportable  


 


Absolute Neutrophils  Not Reportable  


 


Absolute Lymphocytes  Not Reportable  


 


Absolute Monocytes  Not Reportable  


 


Absolute Eosinophils  Not Reportable  


 


Absolute Basophils  Not Reportable  


 


Sodium   


 


Potassium   


 


Chloride   


 


Carbon Dioxide   


 


Anion Gap   


 


BUN   


 


Creatinine   


 


Est GFR ( Amer)   


 


Est GFR (Non-Af Amer)   


 


Glucose   


 


Uric Acid    8.0 H


 


Calcium   


 


Total Bilirubin   


 


AST   


 


ALT   


 


Alkaline Phosphatase   


 


Total Protein   


 


Albumin   


 


Amylase   


 


Lipase   


 


TSH   0.53 


 


Free T4   1.83 


 


Free T3 pg/mL   2.97 


 


Urine Color   


 


Urine Appearance   


 


Urine pH   


 


Ur Specific Gravity   


 


Urine Protein   


 


Urine Glucose (UA)   


 


Urine Ketones   


 


Urine Blood   


 


Urine Nitrite   


 


Ur Leukocyte Esterase   


 


Ur Squamous Epith Cells   














  06/23/17 06/24/17 06/24/17





  16:59 05:39 05:39


 


WBC    5.5


 


RBC    4.14


 


Hgb    12.0


 


Hct    35.7 L


 


MCV    86


 


MCH    29.0


 


MCHC    33.7


 


RDW    12.8


 


Plt Count    256


 


Seg Neutrophils %    59.7


 


Lymphocytes %    27.0


 


Monocytes %    12.1


 


Eosinophils %    0.3


 


Basophils %    0.9


 


Absolute Neutrophils    3.3


 


Absolute Lymphocytes    1.5


 


Absolute Monocytes    0.7


 


Absolute Eosinophils    0.0


 


Absolute Basophils    0.0


 


Sodium   


 


Potassium   


 


Chloride   


 


Carbon Dioxide   


 


Anion Gap   


 


BUN   


 


Creatinine   


 


Est GFR ( Amer)   


 


Est GFR (Non-Af Amer)   


 


Glucose   


 


Uric Acid   


 


Calcium   


 


Total Bilirubin   


 


AST   


 


ALT   


 


Alkaline Phosphatase   


 


Total Protein   


 


Albumin   


 


Amylase   298 H 


 


Lipase   895.1 H 


 


TSH   


 


Free T4   


 


Free T3 pg/mL   


 


Urine Color  YELLOW  


 


Urine Appearance  SLIGHTLY-CLOUDY  


 


Urine pH  6.0  


 


Ur Specific Gravity  1.025  


 


Urine Protein  100 H  


 


Urine Glucose (UA)  NEGATIVE  


 


Urine Ketones  80 H  


 


Urine Blood  NEGATIVE  


 


Urine Nitrite  NEGATIVE  


 


Ur Leukocyte Esterase  NEGATIVE  


 


Ur Squamous Epith Cells  FEW  














  06/24/17





  05:39


 


WBC 


 


RBC 


 


Hgb 


 


Hct 


 


MCV 


 


MCH 


 


MCHC 


 


RDW 


 


Plt Count 


 


Seg Neutrophils % 


 


Lymphocytes % 


 


Monocytes % 


 


Eosinophils % 


 


Basophils % 


 


Absolute Neutrophils 


 


Absolute Lymphocytes 


 


Absolute Monocytes 


 


Absolute Eosinophils 


 


Absolute Basophils 


 


Sodium  138.3


 


Potassium  3.7


 


Chloride  109 H


 


Carbon Dioxide  15 L


 


Anion Gap  14


 


BUN  4 L


 


Creatinine  0.53


 


Est GFR ( Amer)  > 60


 


Est GFR (Non-Af Amer)  > 60


 


Glucose  72 L


 


Uric Acid 


 


Calcium  9.4


 


Total Bilirubin  1.1


 


AST  52 H


 


ALT  78 H


 


Alkaline Phosphatase  111


 


Total Protein  6.7


 


Albumin  3.3 L


 


Amylase 


 


Lipase 


 


TSH 


 


Free T4 


 


Free T3 pg/mL 


 


Urine Color 


 


Urine Appearance 


 


Urine pH 


 


Ur Specific Gravity 


 


Urine Protein 


 


Urine Glucose (UA) 


 


Urine Ketones 


 


Urine Blood 


 


Urine Nitrite 


 


Ur Leukocyte Esterase 


 


Ur Squamous Epith Cells 











Impressions: 


 





Abdomen Ultrasound  06/23/17 00:00


IMPRESSION:  There may be a some sludge in the gallbladder, the study was 

otherwise unremarkable.


 














Assessment & Plan





- Diagnosis


(1) Pancreatitis


Qualifiers: 


     Chronicity: acute     Pancreatitis type: other


Is this a current diagnosis for this admission?: Yes





(2) Dehydration


Is this a current diagnosis for this admission?: Yes








(4) Hyperemesis gravidarum


Is this a current diagnosis for this admission?: Yes








- Time


Time Spent with patient: Less than 15 minutes


Critical Time spent with patient: Less than 15 minutes


Anticipated discharge: Home


Within: within 48 hours





- Inpatient Certification


Based on my medical assessment, after consideration of the patient's 

comorbidities, presenting symptoms, or acuity I expect that the services needed 

warrant INPATIENT care.: Yes


I certify that my determination is in accordance with my understanding of 

Medicare's requirements for reasonable and necessary INPATIENT services [42 CFR 

412.3e].: Yes


Medical Necessity: Need Close Monitoring Due to Risk of Patient Decompensation, 

Need For IV Fluids, Need for Pain Control


Post Hospital Care: D/C Planner Documentation - d/w  Dr. Vazquez who agrees 

with conservative management of signs and symptoms for now.  Feel that patient 

is improving.  d/w pt that termination of pregnancy would not necessarily 

resolve her symptoms and likely a separate process.  voiced understanding.  Pt 

indicates at this time that she desires to continue pregnancy.

## 2017-06-25 LAB
ALBUMIN SERPL-MCNC: 2.8 G/DL (ref 3.5–5)
ALP SERPL-CCNC: 105 U/L (ref 38–126)
ALT SERPL-CCNC: 77 U/L (ref 9–52)
AMYLASE SERPL-CCNC: 247 U/L (ref 30–110)
ANION GAP SERPL CALC-SCNC: 10 MMOL/L (ref 5–19)
AST SERPL-CCNC: 43 U/L (ref 14–36)
BASOPHILS # BLD AUTO: 0 10^3/UL (ref 0–0.2)
BASOPHILS NFR BLD AUTO: 0.7 % (ref 0–2)
BILIRUB DIRECT SERPL-MCNC: 0.5 MG/DL (ref 0–0.4)
BILIRUB SERPL-MCNC: 0.7 MG/DL (ref 0.2–1.3)
BUN SERPL-MCNC: < 2 MG/DL (ref 7–20)
CALCIUM: 8.6 MG/DL (ref 8.4–10.2)
CHLORIDE SERPL-SCNC: 111 MMOL/L (ref 98–107)
CO2 SERPL-SCNC: 17 MMOL/L (ref 22–30)
CREAT SERPL-MCNC: 0.4 MG/DL (ref 0.52–1.25)
EOSINOPHIL # BLD AUTO: 0 10^3/UL (ref 0–0.6)
EOSINOPHIL NFR BLD AUTO: 0.6 % (ref 0–6)
ERYTHROCYTE [DISTWIDTH] IN BLOOD BY AUTOMATED COUNT: 13 % (ref 11.5–14)
GLUCOSE SERPL-MCNC: 122 MG/DL (ref 75–110)
HCT VFR BLD CALC: 31.3 % (ref 36–47)
HGB BLD-MCNC: 10.7 G/DL (ref 12–15.5)
HGB HCT DIFFERENCE: 0.8
LDH1 SERPL-CCNC: 266 U/L (ref 313–618)
LIPASE SERPL-CCNC: 722 U/L (ref 23–300)
LYMPHOCYTES # BLD AUTO: 0.9 10^3/UL (ref 0.5–4.7)
LYMPHOCYTES NFR BLD AUTO: 23.4 % (ref 13–45)
MCH RBC QN AUTO: 28.9 PG (ref 27–33.4)
MCHC RBC AUTO-ENTMCNC: 34.3 G/DL (ref 32–36)
MCV RBC AUTO: 84 FL (ref 80–97)
MONOCYTES # BLD AUTO: 0.5 10^3/UL (ref 0.1–1.4)
MONOCYTES NFR BLD AUTO: 12.5 % (ref 3–13)
NEUTROPHILS # BLD AUTO: 2.4 10^3/UL (ref 1.7–8.2)
NEUTS SEG NFR BLD AUTO: 62.8 % (ref 42–78)
POTASSIUM SERPL-SCNC: 3 MMOL/L (ref 3.6–5)
PROT SERPL-MCNC: 6 G/DL (ref 6.3–8.2)
RBC # BLD AUTO: 3.72 10^6/UL (ref 3.72–5.28)
SODIUM SERPL-SCNC: 137.7 MMOL/L (ref 137–145)
URATE SERPL-MCNC: 3.7 MG/DL (ref 2.5–6.2)
WBC # BLD AUTO: 3.8 10^3/UL (ref 4–10.5)

## 2017-06-25 RX ADMIN — POTASSIUM CHLORIDE SCH ML: 29.8 INJECTION, SOLUTION INTRAVENOUS at 14:42

## 2017-06-25 RX ADMIN — SODIUM CHLORIDE AND POTASSIUM CHLORIDE PRN ML: 9; 2.98 INJECTION, SOLUTION INTRAVENOUS at 20:25

## 2017-06-25 RX ADMIN — POTASSIUM CHLORIDE SCH ML: 29.8 INJECTION, SOLUTION INTRAVENOUS at 13:29

## 2017-06-25 RX ADMIN — SODIUM CHLORIDE AND POTASSIUM CHLORIDE PRN ML: 9; 2.98 INJECTION, SOLUTION INTRAVENOUS at 10:37

## 2017-06-25 RX ADMIN — SODIUM CHLORIDE AND POTASSIUM CHLORIDE PRN ML: 9; 2.98 INJECTION, SOLUTION INTRAVENOUS at 15:29

## 2017-06-25 RX ADMIN — FAMOTIDINE SCH MG: 20 TABLET, FILM COATED ORAL at 09:50

## 2017-06-25 RX ADMIN — SODIUM CHLORIDE AND POTASSIUM CHLORIDE PRN ML: 9; 2.98 INJECTION, SOLUTION INTRAVENOUS at 06:06

## 2017-06-25 RX ADMIN — FAMOTIDINE SCH MG: 20 TABLET, FILM COATED ORAL at 21:20

## 2017-06-25 NOTE — PDOC PROGRESS REPORT
Subjective


Progress Note for:: 06/25/17


Subjective:: 


several episodes of emesis last night but no nausea or emesis since.  She 

reports epigastric pain is resolved.   is in room and very 

confrontational regarding her care.  We reviewed her diagnosis including mild 

pancreatitis and dehydration and need for recussitation.  Dr. Vazquez in room 

to assist with discussion with pt and  as well re; Need for continued 

recussitation and hospitalization but that patient is improving.  She was not 

very verbal during visit (more verbal at admission when  not present)





Physical Exam





- Physical Exam


Vital Signs: 


 











Temp Pulse Resp BP Pulse Ox


 


 98.7 F   84   14   108/60   98 


 


 06/25/17 10:05  06/25/17 10:05  06/25/17 10:05  06/25/17 10:05  06/25/17 10:05








 Intake & Output











 06/24/17 06/25/17 06/26/17





 06:59 06:59 06:59


 


Intake Total  1310 1000


 


Output Total 700 1450 


 


Balance -700 -140 1000


 


Weight 82.1 kg  











General appearance: PRESENT: no acute distress, well-developed, well-nourished


Head exam: PRESENT: atraumatic, normocephalic


Respiratory exam: PRESENT: clear to auscultation bayron, symmetrical, unlabored.  

ABSENT: tachypnea


Cardiovascular exam: PRESENT: RRR.  ABSENT: diastolic murmur, rubs, systolic 

murmur


Vascular exam: PRESENT: normal capillary refill


GI/Abdominal exam: PRESENT: normal bowel sounds, soft.  ABSENT: distended, 

guarding, mass, organolmegaly, rebound, tenderness


Rectal exam: PRESENT: deferred


Extremities exam: PRESENT: full ROM.  ABSENT: calf tenderness, clubbing, pedal 

edema


Neurological exam: PRESENT: alert, awake, oriented to person, oriented to place

, oriented to time, oriented to situation, CN II-XII grossly intact.  ABSENT: 

motor sensory deficit


Psychiatric exam: PRESENT: appropriate affect, normal mood.  ABSENT: homicidal 

ideation, suicidal ideation


Skin exam: PRESENT: dry, intact, warm.  ABSENT: cyanosis, rash





Result


Laboratory Results: 


 





 06/25/17 09:17 





 06/25/17 09:17 





 











  06/24/17 06/24/17 06/24/17





  18:15 18:15 18:18


 


WBC  4.6  


 


RBC  4.23  


 


Hgb  12.2  


 


Hct  36.5  


 


MCV  86  


 


MCH  28.7  


 


MCHC  33.3  


 


RDW  12.6  


 


Plt Count  245  


 


Seg Neutrophils %  57.2  


 


Lymphocytes %  28.5  


 


Monocytes %  12.7  


 


Eosinophils %  0.5  


 


Basophils %  1.1  


 


Absolute Neutrophils  2.6  


 


Absolute Lymphocytes  1.3  


 


Absolute Monocytes  0.6  


 


Absolute Eosinophils  0.0  


 


Absolute Basophils  0.0  


 


Sodium   136.2 L 


 


Potassium   3.1 L 


 


Chloride   107 


 


Carbon Dioxide   18 L 


 


Anion Gap   11 


 


BUN   < 2 L 


 


Creatinine   0.47 L 


 


Est GFR ( Amer)   > 60 


 


Est GFR (Non-Af Amer)   > 60 


 


Glucose   79 


 


Uric Acid   5.2 


 


Calcium   9.0 


 


Total Bilirubin   1.0 


 


AST   54 H 


 


ALT   84 H 


 


Alkaline Phosphatase   121 


 


Total Protein   7.1 


 


Albumin   3.5 


 


Amylase   259 H 


 


Lipase   819.9 H 


 


Ur 24 Hour Volume    920


 


Ur Total Protein 24 Hr    512 H














  06/24/17 06/25/17 06/25/17





  18:18 09:17 09:17


 


WBC   3.8 L 


 


RBC   3.72 


 


Hgb   10.7 L 


 


Hct   31.3 L 


 


MCV   84 


 


MCH   28.9 


 


MCHC   34.3 


 


RDW   13.0 


 


Plt Count   233 


 


Seg Neutrophils %   62.8 


 


Lymphocytes %   23.4 


 


Monocytes %   12.5 


 


Eosinophils %   0.6 


 


Basophils %   0.7 


 


Absolute Neutrophils   2.4 


 


Absolute Lymphocytes   0.9 


 


Absolute Monocytes   0.5 


 


Absolute Eosinophils   0.0 


 


Absolute Basophils   0.0 


 


Sodium    137.7


 


Potassium    3.0 L*


 


Chloride    111 H


 


Carbon Dioxide    17 L


 


Anion Gap    10


 


BUN    < 2 L


 


Creatinine    0.40 L


 


Est GFR ( Amer)    > 60


 


Est GFR (Non-Af Amer)    > 60


 


Glucose    122 H


 


Uric Acid    3.7


 


Calcium    8.6


 


Total Bilirubin    0.7


 


AST    43 H


 


ALT    77 H


 


Alkaline Phosphatase    105


 


Total Protein    6.0 L


 


Albumin    2.8 L


 


Amylase    247 H


 


Lipase    722.0 H


 


Ur 24 Hour Volume  920  


 


Ur Total Protein 24 Hr   








 





06/23/17 16:59   Clean Catch Midstream   Urine Culture - Final


                            NO GROWTH 2 DAYS








Impressions: 


 





Abdomen Ultrasound  06/23/17 00:00


IMPRESSION:  There may be a some sludge in the gallbladder, the study was 

otherwise unremarkable.


 














Assessment & Plan





- Diagnosis


(1) Pancreatitis


Qualifiers: 


     Chronicity: acute     Pancreatitis type: other


Is this a current diagnosis for this admission?: YesPlan: 











Pt with improved symptoms and epigastric pain since evaluated on Friday 

evening.  We reviewed continued IVF and need for continued rehydration. We 

reviewed that dehydration/pancreatitis is slow process to recover and needs to 

be accomplished in stages.  Reviewed IVF with RN.  Pt has been recieving 250ml/

hr since yesterday.  I/O not recorded correctly - but should be at least 3000ml 

per review of records but should have been closer to 5000ml.    








(2) Dehydration


Is this a current diagnosis for this admission?: YesPlan: 





Banana Bag and IVF ordered.  IVF adjusted and daily weight and strict I/O 

requested.  Banana bag to run at 250ml/hr, IVF D5NS to run at 250ml/hr.  Will 

try clear liquids today.








(3) Hyperemesis gravidarum


Is this a current diagnosis for this admission?: YesPlan: 








Meds reviewed and IVF fluids reviewed.  Cont strict I/O.  Cont daily labs.  

reviewed if needed may need PICC line for continued hydration in the future HE 

etc resolve.  Of note  has refused admission for pt after multiple ER 

visits and even after documented weight loss.  Was admitted last weekend with 

HE and now with pancreatitis/inflammation and Dehydration.   still 

wishes to terminate pregnancy.  D/w pt who does NOT want to terminate pregnancy 

and wishes to continue with treatment.   states he is going to sign her 

out AMA and take her to [place] to terminate. DC/planning consult ordered 








(4) Hypokalemia


Is this a current diagnosis for this admission?: YesPlan: 


pt recieved 2 K riders yesterday with sill low potassium - 2 ordered for today.

  Na was low yesterday and IVF adjusted yesterday with improvement in Na today.








(5) Protein in urine


Qualifiers: 


     Proteinuria type: other        Qualified Code(s): R80.8 - Other 

proteinuria  


Is this a current diagnosis for this admission?: YesPlan: 


Pt with reported chronic HTN although normal BPs in hospital.  24 hr UTP 

elevated which may also be related to severe dehydration.  Nephrology consult 

placed yesterday.











- Time


Time Spent with patient: 35 or more minutes


Critical Time spent with patient: Less than 15 minutes


Medications reviewed and adjusted accordingly: Yes


Anticipated discharge: Home


Within: within 72 hours


Disposition: 


Home when improvement stable for discharge





- Inpatient Certification


Based on my medical assessment, after consideration of the patient's 

comorbidities, presenting symptoms, or acuity I expect that the services needed 

warrant INPATIENT care.: Yes


I certify that my determination is in accordance with my understanding of 

Medicare's requirements for reasonable and necessary INPATIENT services [42 CFR 

412.3e].: Yes


Medical Necessity: Significant Comorbidiites Make Outpatient Treatment Too Risky

, Need Close Monitoring Due to Risk of Patient Decompensation, Need For IV 

Fluids


Post Hospital Care: D/C Planner Documentation

## 2017-06-25 NOTE — PDOC PROGRESS REPORT
Subjective


Progress Note for:: 17


Subjective:: 


Patient has no complaints.  She states her urine is becoming clear.





Physical Exam


Vital Signs: 


 











Temp Pulse Resp BP Pulse Ox


 


 98.7 F   84   14   108/60   98 


 


 17 10:05  17 10:05  17 10:05  17 10:05  17 10:05








 Intake & Output











 17





 06:59 06:59 06:59


 


Intake Total  1310 1000


 


Output Total 700 1450 


 


Balance -700 -140 1000


 


Weight 82.1 kg  











General appearance: PRESENT: no acute distress, other - Affect flat.


GI/Abdominal exam: PRESENT: other - Soft, nontender no peritoneal signs no 

rigidity.





Results


Laboratory Results: 


 





 17 09:17 





 17 09:17 





 











  17





  18:15 18:15 18:18


 


WBC  4.6  


 


RBC  4.23  


 


Hgb  12.2  


 


Hct  36.5  


 


MCV  86  


 


MCH  28.7  


 


MCHC  33.3  


 


RDW  12.6  


 


Plt Count  245  


 


Seg Neutrophils %  57.2  


 


Lymphocytes %  28.5  


 


Monocytes %  12.7  


 


Eosinophils %  0.5  


 


Basophils %  1.1  


 


Absolute Neutrophils  2.6  


 


Absolute Lymphocytes  1.3  


 


Absolute Monocytes  0.6  


 


Absolute Eosinophils  0.0  


 


Absolute Basophils  0.0  


 


Sodium   136.2 L 


 


Potassium   3.1 L 


 


Chloride   107 


 


Carbon Dioxide   18 L 


 


Anion Gap   11 


 


BUN   < 2 L 


 


Creatinine   0.47 L 


 


Est GFR ( Amer)   > 60 


 


Est GFR (Non-Af Amer)   > 60 


 


Glucose   79 


 


Uric Acid   5.2 


 


Calcium   9.0 


 


Total Bilirubin   1.0 


 


AST   54 H 


 


ALT   84 H 


 


Alkaline Phosphatase   121 


 


Total Protein   7.1 


 


Albumin   3.5 


 


Amylase   259 H 


 


Lipase   819.9 H 


 


Ur 24 Hour Volume    920


 


Ur Total Protein 24 Hr    512 H














  17





  18:18 09:17 09:17


 


WBC   3.8 L 


 


RBC   3.72 


 


Hgb   10.7 L 


 


Hct   31.3 L 


 


MCV   84 


 


MCH   28.9 


 


MCHC   34.3 


 


RDW   13.0 


 


Plt Count   233 


 


Seg Neutrophils %   62.8 


 


Lymphocytes %   23.4 


 


Monocytes %   12.5 


 


Eosinophils %   0.6 


 


Basophils %   0.7 


 


Absolute Neutrophils   2.4 


 


Absolute Lymphocytes   0.9 


 


Absolute Monocytes   0.5 


 


Absolute Eosinophils   0.0 


 


Absolute Basophils   0.0 


 


Sodium    137.7


 


Potassium    3.0 L*


 


Chloride    111 H


 


Carbon Dioxide    17 L


 


Anion Gap    10


 


BUN    < 2 L


 


Creatinine    0.40 L


 


Est GFR ( Amer)    > 60


 


Est GFR (Non-Af Amer)    > 60


 


Glucose    122 H


 


Uric Acid    3.7


 


Calcium    8.6


 


Total Bilirubin    0.7


 


AST    43 H


 


ALT    77 H


 


Alkaline Phosphatase    105


 


Total Protein    6.0 L


 


Albumin    2.8 L


 


Amylase    247 H


 


Lipase    722.0 H


 


Ur 24 Hour Volume  920  


 


Ur Total Protein 24 Hr   








 





17 16:59   Clean Catch Midstream   Urine Culture - Final


                            NO GROWTH 2 DAYS








Impressions: 


 





Abdomen Ultrasound  17 00:00


IMPRESSION:  There may be a some sludge in the gallbladder, the study was 

otherwise unremarkable.


 














Assessment & Plan





- Diagnosis


(1) Pancreatitis


Qualifiers: 


     Chronicity: acute     Pancreatitis type: other


Is this a current diagnosis for this admission?: YesPlan: 


1.  Clinically stable without any evidence of deterioration in patient's 

abdominal complaints or abdominal exam.  The abnormal lipase and amylase are 

stable not rising.  I do not believe the patient requires further investigation 

or intervention regarding her pancreas or her gallbladder at this time.








2.  I believe the patient's principal problem now is persisting under 

resuscitation due to dehydration.  I have discussed this with Dr. Keita and 

we agree additional fluid resuscitation and correction of electrolyte 

abnormalities needs to continue.





I was present in the room during discussion between Dr. Keita, the patient 

and patient's .  Because we do not know exactly the etiology of patient'

s underlying hyperemesis , other than pregnancy, and generalized failure 

to thrive, the  is somewhat frustrated with the recurrent need to bring 

the patient to the hospital, get treated etc.





Reassurance was provided.  As of this moment, I believe the patient should 

continue to receive the fluids and electrolyte replacement.  Surgery will be 

available on an as-needed basis.

## 2017-06-26 LAB
ALBUMIN SERPL-MCNC: 2.6 G/DL (ref 3.5–5)
ALP SERPL-CCNC: 98 U/L (ref 38–126)
ALT SERPL-CCNC: 68 U/L (ref 9–52)
AMYLASE SERPL-CCNC: 160 U/L (ref 30–110)
ANION GAP SERPL CALC-SCNC: 9 MMOL/L (ref 5–19)
AST SERPL-CCNC: 38 U/L (ref 14–36)
BILIRUB DIRECT SERPL-MCNC: 0.4 MG/DL (ref 0–0.4)
BILIRUB SERPL-MCNC: 0.5 MG/DL (ref 0.2–1.3)
BUN SERPL-MCNC: < 2 MG/DL (ref 7–20)
CALCIUM: 8.3 MG/DL (ref 8.4–10.2)
CHLORIDE SERPL-SCNC: 110 MMOL/L (ref 98–107)
CO2 SERPL-SCNC: 20 MMOL/L (ref 22–30)
CREAT SERPL-MCNC: 0.37 MG/DL (ref 0.52–1.25)
GLUCOSE SERPL-MCNC: 104 MG/DL (ref 75–110)
LIPASE SERPL-CCNC: 425 U/L (ref 23–300)
POTASSIUM SERPL-SCNC: 2.9 MMOL/L (ref 3.6–5)
PROT SERPL-MCNC: 5.6 G/DL (ref 6.3–8.2)
SODIUM SERPL-SCNC: 138.5 MMOL/L (ref 137–145)
URATE SERPL-MCNC: 2.1 MG/DL (ref 2.5–6.2)

## 2017-06-26 PROCEDURE — B518YZA FLUOROSCOPY OF SUPERIOR VENA CAVA USING OTHER CONTRAST, GUIDANCE: ICD-10-PCS | Performed by: RADIOLOGY

## 2017-06-26 PROCEDURE — 02HV33Z INSERTION OF INFUSION DEVICE INTO SUPERIOR VENA CAVA, PERCUTANEOUS APPROACH: ICD-10-PCS | Performed by: RADIOLOGY

## 2017-06-26 PROCEDURE — B548ZZA ULTRASONOGRAPHY OF SUPERIOR VENA CAVA, GUIDANCE: ICD-10-PCS | Performed by: RADIOLOGY

## 2017-06-26 RX ADMIN — LEUCINE, PHENYLALANINE, LYSINE, METHIONINE, ISOLEUCINE, VALINE, HISTIDINE, THREONINE, TRYPTOPHAN, ALANINE, GLYCINE, ARGININE, PROLINE, SERINE, TYROSINE, DEXTROSE PRN ML: 365; 280; 290; 200; 300; 290; 240; 210; 90; 1035; 515; 575; 340; 250; 20; 25 INJECTION INTRAVENOUS at 18:06

## 2017-06-26 RX ADMIN — SODIUM CHLORIDE AND POTASSIUM CHLORIDE PRN ML: 9; 2.98 INJECTION, SOLUTION INTRAVENOUS at 04:45

## 2017-06-26 RX ADMIN — SODIUM CHLORIDE AND POTASSIUM CHLORIDE PRN ML: 9; 2.98 INJECTION, SOLUTION INTRAVENOUS at 00:15

## 2017-06-26 RX ADMIN — FAMOTIDINE SCH MG: 20 TABLET, FILM COATED ORAL at 09:39

## 2017-06-26 RX ADMIN — DEXTROSE, SODIUM CHLORIDE, AND POTASSIUM CHLORIDE PRN ML: 5; .45; .15 INJECTION INTRAVENOUS at 19:16

## 2017-06-26 RX ADMIN — DEXTROSE, SODIUM CHLORIDE, AND POTASSIUM CHLORIDE PRN ML: 5; .45; .15 INJECTION INTRAVENOUS at 12:10

## 2017-06-26 NOTE — PDOC PROGRESS REPORT
Subjective


Progress Note for:: 06/26/17


Subjective:: 


Pt had emesis once yesterday but tolerating some clears. Notes abd pain 

resolved. Occasional nausea. No vag bleeding.








Physical Exam





- Physical Exam


Vital Signs: 


 











Temp Pulse Resp BP Pulse Ox


 


 98.7 F   79   16   129/78 H  100 


 


 06/26/17 15:25  06/26/17 15:25  06/26/17 15:25  06/26/17 15:25  06/26/17 15:25








 Intake & Output











 06/25/17 06/26/17 06/27/17





 06:59 06:59 06:59


 


Intake Total 1310 6715 2000


 


Output Total 1450 4400 2600


 


Balance -140 2315 -600


 


Weight  87.9 kg 











General appearance: PRESENT: no acute distress


GI/Abdominal exam: PRESENT: normal bowel sounds, soft.  ABSENT: distended, 

guarding, mass, organolmegaly, rebound, tenderness





Result


Laboratory Results: 


 





 06/25/17 09:17 





 06/26/17 06:14 





 











  06/26/17 06/26/17





  06:14 06:14


 


Sodium  138.5 


 


Potassium  2.9 L* 


 


Chloride  110 H 


 


Carbon Dioxide  20 L 


 


Anion Gap  9 


 


BUN  < 2 L 


 


Creatinine  0.37 L 


 


Est GFR ( Amer)  > 60 


 


Est GFR (Non-Af Amer)  > 60 


 


Glucose  104 


 


Uric Acid  2.1 L 


 


Calcium  8.3 L 


 


Total Bilirubin  0.5 


 


AST  38 H 


 


ALT  68 H 


 


Alkaline Phosphatase  98 


 


Total Protein  5.6 L 


 


Albumin  2.6 L 


 


Triglycerides   93


 


Amylase  160 H 


 


Lipase  425.0 H 











Impressions: 


 





Abdomen Ultrasound  06/23/17 00:00


IMPRESSION:  There may be a some sludge in the gallbladder, the study was 

otherwise unremarkable.


 








Guidance Fluoroscopy  06/26/17 00:00


IMPRESSION:  SUCCESSFUL PLACEMENT OF A 5 FR DUAL LUMEN 37 CM PICC IN THE LEFT 

BASILIC VEIN.


 








Interventional Vascular Procedure  06/26/17 00:00


IMPRESSION:  SUCCESSFUL PLACEMENT OF A 5 FR DUAL LUMEN 37 CM PICC IN THE LEFT 

BASILIC VEIN.


 








PICC Line Insertion  06/26/17 00:00


IMPRESSION:  SUCCESSFUL PLACEMENT OF A 5 FR DUAL LUMEN 37 CM PICC IN THE LEFT 

BASILIC VEIN.


 














Assessment & Plan





- Diagnosis


(1) Hypokalemia


Is this a current diagnosis for this admission?: Yes





(2) Pancreatitis


Qualifiers: 


     Chronicity: acute     Pancreatitis type: other


Is this a current diagnosis for this admission?: Yes





(3) Hyperemesis gravidarum


Is this a current diagnosis for this admission?: Yes








- Plan Summary


Plan Summary: 


Discussed PICC line which may be used for ivfs and tpn until pt able to 

maintain nutrition and hydration orally. Will rplete potassium. Will follow 

daily lytes and amylase, lipase....consider advancing diet to bland if labs 

cont to improve. Cont antimetices and H2 blocker.

## 2017-06-26 NOTE — RADIOLOGY REPORT (SQ)
EXAM DESCRIPTION:  PICC INSERTION; FLUORO/CV PLACEMENT; U/S GUIDE FOR VASCULAR ACCESS



COMPLETED DATE/TIME:  6/26/2017 11:35 am



REASON FOR STUDY:  PICC LINE/ TPN; TPN



COMPARISON:  Two-view chest 1/31/2011



FLUOROSCOPY TIME:  3 seconds

1 ultrasound and 2 C-arm images saved to PACS.



TECHNIQUE:  Fluoroscopic and ultrasound guided PICC placement.



LIMITATIONS:  None.



PROCEDURE:  After written consent and assessment were obtained, the patient was brought into the fluo
roscopy room and place supine on the table.  Ultrasound was used on the patient's left arm for PICC a
ccess. The left arm was prepped and draped in a sterile fashion along with the ultrasound probe. The 
entry site was anesthetized with 3.5 mL of 1% lidocaine. A 21 gauge 7 cm needle was advanced through 
the skin and into the left basilic vein under live ultrasound guidance.  An ultrasound image was save
d to PACS confirming access site.  A .018 guide wire was then inserted through the needle and into th
e venous system. The needle was the removed and an 11 blade scalpel was used to make a 1cm skin incis
ion.  A 5 fr peel-away sheath was advanced over the wire and into the venous system. A measurement wa
s then made using the existing wire and live fluoroscopic guidance. The wire was then removed and the
 trimmed. The PICC was advanced through the peel-away sheath and into the venous system. The peel-rhonda
y sheath was removed and the catheter was adhered to the patients arm with a stat lock. The catheter 
was then aspirated and flushed and a sterile bandage was placed over the access site.  A fluoroscopic
 spot image was saved to PACS confirming the catheter tip within the superior vena cava.

THE PATIENT'S ABDOMEN AND PELVIS WAS DOUBLE SHIELDED WITH LEAD APRONS FOR THE PROCEDURE



IMPRESSION:  SUCCESSFUL PLACEMENT OF A 5 FR DUAL LUMEN 37 CM PICC IN THE LEFT BASILIC VEIN.



COMMENT:  Patient medication list reviewed: Yes- Quality ID# [id number]:Eligible professional attests to doc
umenting in the medical record they obtained, updated, or reviewed the patient's current medications.
.

Quality :  Final reports for procedures using fluoroscopy that document radiation exposure sherlyn
lydia, or exposure time and number of fluorographic images (if radiation exposure indices are not avail
able)

Quality ID #76: The patient was prepped and draped using maximum sterile barrier technique including 
cap, mask, sterile gown, sterile gloves, a large sterile sheet, hand hygiene, and 2% Chlorhexidine fo
r cutaneous antisepsis. When ultrasound is used, sterile ultrasound techniques are followed requiring
 sterile gel and sterile probes.



TECHNICAL DOCUMENTATION:  JOB ID:  [id number]

 2011 [employer]- All Rights Reserved

## 2017-06-26 NOTE — RADIOLOGY REPORT (SQ)
EXAM DESCRIPTION:  PICC INSERTION; FLUORO/CV PLACEMENT; U/S GUIDE FOR VASCULAR ACCESS



COMPLETED DATE/TIME:  6/26/2017 11:35 am



REASON FOR STUDY:  PICC LINE/ TPN; TPN



COMPARISON:  Two-view chest 1/31/2011



FLUOROSCOPY TIME:  3 seconds

1 ultrasound and 2 C-arm images saved to PACS.



TECHNIQUE:  Fluoroscopic and ultrasound guided PICC placement.



LIMITATIONS:  None.



PROCEDURE:  After written consent and assessment were obtained, the patient was brought into the fluo
roscopy room and place supine on the table.  Ultrasound was used on the patient's left arm for PICC a
ccess. The left arm was prepped and draped in a sterile fashion along with the ultrasound probe. The 
entry site was anesthetized with 3.5 mL of 1% lidocaine. A 21 gauge 7 cm needle was advanced through 
the skin and into the left basilic vein under live ultrasound guidance.  An ultrasound image was save
d to PACS confirming access site.  A .018 guide wire was then inserted through the needle and into th
e venous system. The needle was the removed and an 11 blade scalpel was used to make a 1cm skin incis
ion.  A 5 fr peel-away sheath was advanced over the wire and into the venous system. A measurement wa
s then made using the existing wire and live fluoroscopic guidance. The wire was then removed and the
 trimmed. The PICC was advanced through the peel-away sheath and into the venous system. The peel-rhonda
y sheath was removed and the catheter was adhered to the patients arm with a stat lock. The catheter 
was then aspirated and flushed and a sterile bandage was placed over the access site.  A fluoroscopic
 spot image was saved to PACS confirming the catheter tip within the superior vena cava.

THE PATIENT'S ABDOMEN AND PELVIS WAS DOUBLE SHIELDED WITH LEAD APRONS FOR THE PROCEDURE



IMPRESSION:  SUCCESSFUL PLACEMENT OF A 5 FR DUAL LUMEN 37 CM PICC IN THE LEFT BASILIC VEIN.



COMMENT:  Patient medication list reviewed: Yes- Quality ID# [id number]:Eligible professional attests to doc
umenting in the medical record they obtained, updated, or reviewed the patient's current medications.
.

Quality :  Final reports for procedures using fluoroscopy that document radiation exposure sherlyn
lydia, or exposure time and number of fluorographic images (if radiation exposure indices are not avail
able)

Quality ID #76: The patient was prepped and draped using maximum sterile barrier technique including 
cap, mask, sterile gown, sterile gloves, a large sterile sheet, hand hygiene, and 2% Chlorhexidine fo
r cutaneous antisepsis. When ultrasound is used, sterile ultrasound techniques are followed requiring
 sterile gel and sterile probes.



TECHNICAL DOCUMENTATION:  JOB ID:  [id number]

 2011 [employer]- All Rights Reserved

## 2017-06-26 NOTE — RADIOLOGY REPORT (SQ)
EXAM DESCRIPTION:  PICC INSERTION; FLUORO/CV PLACEMENT; U/S GUIDE FOR VASCULAR ACCESS



COMPLETED DATE/TIME:  6/26/2017 11:35 am



REASON FOR STUDY:  PICC LINE/ TPN; TPN



COMPARISON:  Two-view chest 1/31/2011



FLUOROSCOPY TIME:  3 seconds

1 ultrasound and 2 C-arm images saved to PACS.



TECHNIQUE:  Fluoroscopic and ultrasound guided PICC placement.



LIMITATIONS:  None.



PROCEDURE:  After written consent and assessment were obtained, the patient was brought into the fluo
roscopy room and place supine on the table.  Ultrasound was used on the patient's left arm for PICC a
ccess. The left arm was prepped and draped in a sterile fashion along with the ultrasound probe. The 
entry site was anesthetized with 3.5 mL of 1% lidocaine. A 21 gauge 7 cm needle was advanced through 
the skin and into the left basilic vein under live ultrasound guidance.  An ultrasound image was save
d to PACS confirming access site.  A .018 guide wire was then inserted through the needle and into th
e venous system. The needle was the removed and an 11 blade scalpel was used to make a 1cm skin incis
ion.  A 5 fr peel-away sheath was advanced over the wire and into the venous system. A measurement wa
s then made using the existing wire and live fluoroscopic guidance. The wire was then removed and the
 trimmed. The PICC was advanced through the peel-away sheath and into the venous system. The peel-rhonda
y sheath was removed and the catheter was adhered to the patients arm with a stat lock. The catheter 
was then aspirated and flushed and a sterile bandage was placed over the access site.  A fluoroscopic
 spot image was saved to PACS confirming the catheter tip within the superior vena cava.

THE PATIENT'S ABDOMEN AND PELVIS WAS DOUBLE SHIELDED WITH LEAD APRONS FOR THE PROCEDURE



IMPRESSION:  SUCCESSFUL PLACEMENT OF A 5 FR DUAL LUMEN 37 CM PICC IN THE LEFT BASILIC VEIN.



COMMENT:  Patient medication list reviewed: Yes- Quality ID# [id number]:Eligible professional attests to doc
umenting in the medical record they obtained, updated, or reviewed the patient's current medications.
.

Quality :  Final reports for procedures using fluoroscopy that document radiation exposure sherlyn
lydia, or exposure time and number of fluorographic images (if radiation exposure indices are not avail
able)

Quality ID #76: The patient was prepped and draped using maximum sterile barrier technique including 
cap, mask, sterile gown, sterile gloves, a large sterile sheet, hand hygiene, and 2% Chlorhexidine fo
r cutaneous antisepsis. When ultrasound is used, sterile ultrasound techniques are followed requiring
 sterile gel and sterile probes.



TECHNICAL DOCUMENTATION:  JOB ID:  [id number]

 2011 [employer]- All Rights Reserved

## 2017-06-27 LAB
ALBUMIN SERPL-MCNC: 2.9 G/DL (ref 3.5–5)
ALP SERPL-CCNC: 105 U/L (ref 38–126)
ALT SERPL-CCNC: 59 U/L (ref 9–52)
AMYLASE SERPL-CCNC: 113 U/L (ref 30–110)
ANION GAP SERPL CALC-SCNC: 8 MMOL/L (ref 5–19)
AST SERPL-CCNC: 35 U/L (ref 14–36)
BILIRUB DIRECT SERPL-MCNC: 0.3 MG/DL (ref 0–0.4)
BILIRUB SERPL-MCNC: 0.4 MG/DL (ref 0.2–1.3)
BUN SERPL-MCNC: < 2 MG/DL (ref 7–20)
CALCIUM: 8.9 MG/DL (ref 8.4–10.2)
CHLORIDE SERPL-SCNC: 106 MMOL/L (ref 98–107)
CO2 SERPL-SCNC: 23 MMOL/L (ref 22–30)
CREAT SERPL-MCNC: 0.4 MG/DL (ref 0.52–1.25)
GLUCOSE SERPL-MCNC: 89 MG/DL (ref 75–110)
LIPASE SERPL-CCNC: 309.9 U/L (ref 23–300)
PHOSPHATE SERPL-MCNC: 3.3 MG/DL (ref 2.5–4.5)
POTASSIUM SERPL-SCNC: 3.3 MMOL/L (ref 3.6–5)
PREALB SERPL-MCNC: 11.6 MG/DL (ref 17.6–36)
PROT SERPL-MCNC: 5.9 G/DL (ref 6.3–8.2)
SODIUM SERPL-SCNC: 137.2 MMOL/L (ref 137–145)

## 2017-06-27 RX ADMIN — SODIUM CHLORIDE SCH ML: 9 INJECTION INTRAMUSCULAR; INTRAVENOUS; SUBCUTANEOUS at 00:20

## 2017-06-27 RX ADMIN — SODIUM CHLORIDE SCH ML: 9 INJECTION INTRAMUSCULAR; INTRAVENOUS; SUBCUTANEOUS at 09:27

## 2017-06-27 RX ADMIN — Medication SCH UNIT: at 21:18

## 2017-06-27 RX ADMIN — Medication SCH UNIT: at 00:20

## 2017-06-27 RX ADMIN — DEXTROSE, SODIUM CHLORIDE, AND POTASSIUM CHLORIDE PRN ML: 5; .45; .15 INJECTION INTRAVENOUS at 16:37

## 2017-06-27 RX ADMIN — INSULIN HUMAN PRN UNIT: 100 INJECTION, SOLUTION PARENTERAL at 23:46

## 2017-06-27 RX ADMIN — DEXTROSE, SODIUM CHLORIDE, AND POTASSIUM CHLORIDE PRN ML: 5; .45; .15 INJECTION INTRAVENOUS at 06:05

## 2017-06-27 RX ADMIN — LEUCINE, PHENYLALANINE, LYSINE, METHIONINE, ISOLEUCINE, VALINE, HISTIDINE, THREONINE, TRYPTOPHAN, ALANINE, GLYCINE, ARGININE, PROLINE, SERINE, TYROSINE, DEXTROSE PRN ML: 365; 280; 290; 200; 300; 290; 240; 210; 90; 1035; 515; 575; 340; 250; 20; 25 INJECTION INTRAVENOUS at 19:32

## 2017-06-27 RX ADMIN — Medication SCH UNIT: at 09:27

## 2017-06-27 RX ADMIN — SODIUM CHLORIDE SCH ML: 9 INJECTION INTRAMUSCULAR; INTRAVENOUS; SUBCUTANEOUS at 21:18

## 2017-06-28 LAB
ALBUMIN SERPL-MCNC: 2.7 G/DL (ref 3.5–5)
ALP SERPL-CCNC: 104 U/L (ref 38–126)
ALT SERPL-CCNC: 66 U/L (ref 9–52)
AMYLASE SERPL-CCNC: 131 U/L (ref 30–110)
ANION GAP SERPL CALC-SCNC: 8 MMOL/L (ref 5–19)
AST SERPL-CCNC: 45 U/L (ref 14–36)
BILIRUB DIRECT SERPL-MCNC: 0.3 MG/DL (ref 0–0.4)
BILIRUB SERPL-MCNC: 0.4 MG/DL (ref 0.2–1.3)
BUN SERPL-MCNC: 3 MG/DL (ref 7–20)
CALCIUM: 8.6 MG/DL (ref 8.4–10.2)
CHLORIDE SERPL-SCNC: 105 MMOL/L (ref 98–107)
CO2 SERPL-SCNC: 24 MMOL/L (ref 22–30)
CREAT SERPL-MCNC: 0.4 MG/DL (ref 0.52–1.25)
GLUCOSE SERPL-MCNC: 96 MG/DL (ref 75–110)
LIPASE SERPL-CCNC: 385 U/L (ref 23–300)
MAGNESIUM SERPL-MCNC: 1.7 MG/DL (ref 1.6–2.3)
PHOSPHATE SERPL-MCNC: 3.8 MG/DL (ref 2.5–4.5)
POTASSIUM SERPL-SCNC: 3.8 MMOL/L (ref 3.6–5)
PREALB SERPL-MCNC: 10.9 MG/DL (ref 17.6–36)
PROT SERPL-MCNC: 5.4 G/DL (ref 6.3–8.2)
SODIUM SERPL-SCNC: 136.6 MMOL/L (ref 137–145)

## 2017-06-28 RX ADMIN — DEXTROSE, SODIUM CHLORIDE, AND POTASSIUM CHLORIDE PRN ML: 5; .45; .15 INJECTION INTRAVENOUS at 14:37

## 2017-06-28 RX ADMIN — Medication SCH UNIT: at 10:49

## 2017-06-28 RX ADMIN — Medication SCH UNIT: at 21:38

## 2017-06-28 RX ADMIN — SODIUM CHLORIDE SCH ML: 9 INJECTION INTRAMUSCULAR; INTRAVENOUS; SUBCUTANEOUS at 21:38

## 2017-06-28 RX ADMIN — INSULIN HUMAN PRN UNIT: 100 INJECTION, SOLUTION PARENTERAL at 23:33

## 2017-06-28 RX ADMIN — DEXTROSE, SODIUM CHLORIDE, AND POTASSIUM CHLORIDE PRN ML: 5; .45; .15 INJECTION INTRAVENOUS at 04:03

## 2017-06-28 RX ADMIN — SODIUM CHLORIDE SCH ML: 9 INJECTION INTRAMUSCULAR; INTRAVENOUS; SUBCUTANEOUS at 10:49

## 2017-06-28 RX ADMIN — DEXTROSE, SODIUM CHLORIDE, AND POTASSIUM CHLORIDE PRN ML: 5; .45; .15 INJECTION INTRAVENOUS at 23:37

## 2017-06-28 RX ADMIN — LEUCINE, PHENYLALANINE, LYSINE, METHIONINE, ISOLEUCINE, VALINE, HISTIDINE, THREONINE, TRYPTOPHAN, ALANINE, GLYCINE, ARGININE, PROLINE, SERINE, TYROSINE, DEXTROSE PRN ML: 365; 280; 290; 200; 300; 290; 240; 210; 90; 1035; 515; 575; 340; 250; 20; 25 INJECTION INTRAVENOUS at 14:34

## 2017-06-28 NOTE — PDOC PROGRESS REPORT
Subjective


Progress Note for:: 06/27/17


Subjective:: 


no nausea or emesis for several days.  She is tolerating the clear liquid diet 

but is not eating it since it does not taste good - we reviewed need to eat 

clear liquid diet.  She reports epigastric pain is resolved.  She overall feels 

better.





Physical Exam





- Physical Exam


Vital Signs: 


 











Temp Pulse Resp BP Pulse Ox


 


 98.3 F   94   14   118/79   100 


 


 06/27/17 11:37  06/27/17 11:37  06/27/17 11:37  06/27/17 11:37  06/27/17 11:37








 Intake & Output











 06/26/17 06/27/17 06/28/17





 06:59 06:59 06:59


 


Intake Total 6715 3890 540


 


Output Total 4400 5150 


 


Balance 2315 -1260 540


 


Weight 87.9 kg 86.2 kg 











General appearance: PRESENT: no acute distress, well-developed, well-nourished


Head exam: PRESENT: atraumatic, normocephalic


Neck exam: PRESENT: full ROM.  ABSENT: carotid bruit, JVD, lymphadenopathy, 

thyromegaly


Respiratory exam: PRESENT: clear to auscultation bayron, symmetrical, unlabored


Cardiovascular exam: PRESENT: RRR.  ABSENT: diastolic murmur, rubs, systolic 

murmur


Pulses: PRESENT: normal dorsalis pedis pul, +2 pedal pulses bilateral


Vascular exam: PRESENT: normal capillary refill


GI/Abdominal exam: PRESENT: normal bowel sounds, soft.  ABSENT: distended, 

guarding, mass, organolmegaly, rebound, tenderness


Rectal exam: PRESENT: deferred


Extremities exam: PRESENT: full ROM.  ABSENT: calf tenderness, clubbing, pedal 

edema


Neurological exam: PRESENT: alert, awake, oriented to person, oriented to place

, oriented to time, oriented to situation, CN II-XII grossly intact.  ABSENT: 

motor sensory deficit


Psychiatric exam: PRESENT: appropriate affect, normal mood.  ABSENT: homicidal 

ideation, suicidal ideation


Skin exam: PRESENT: dry, intact, warm.  ABSENT: cyanosis, rash





Result


Laboratory Results: 


 





 06/25/17 09:17 





 06/27/17 05:50 





 











  06/27/17 06/27/17





  05:50 05:50


 


Sodium   137.2


 


Potassium   3.3 L


 


Chloride   106


 


Carbon Dioxide   23


 


Anion Gap   8


 


BUN   < 2 L


 


Creatinine   0.40 L


 


Est GFR ( Amer)   > 60


 


Est GFR (Non-Af Amer)   > 60


 


Glucose   89


 


Calcium   8.9


 


Phosphorus   3.3


 


Total Bilirubin   0.4


 


AST   35


 


ALT   59 H


 


Alkaline Phosphatase   105


 


Total Protein   5.9 L


 


Albumin   2.9 L


 


Prealbumin   11.6 L


 


Amylase  113 H 


 


Lipase  309.9 H 











Impressions: 


 





Abdomen Ultrasound  06/23/17 00:00


IMPRESSION:  There may be a some sludge in the gallbladder, the study was 

otherwise unremarkable.


 








Guidance Fluoroscopy  06/26/17 00:00


IMPRESSION:  SUCCESSFUL PLACEMENT OF A 5 FR DUAL LUMEN 37 CM PICC IN THE LEFT 

BASILIC VEIN.


 








Interventional Vascular Procedure  06/26/17 00:00


IMPRESSION:  SUCCESSFUL PLACEMENT OF A 5 FR DUAL LUMEN 37 CM PICC IN THE LEFT 

BASILIC VEIN.


 








PICC Line Insertion  06/26/17 00:00


IMPRESSION:  SUCCESSFUL PLACEMENT OF A 5 FR DUAL LUMEN 37 CM PICC IN THE LEFT 

BASILIC VEIN.


 














Assessment & Plan





- Diagnosis


(1) Pancreatitis


Qualifiers: 


     Chronicity: acute     Pancreatitis type: other


Is this a current diagnosis for this admission?: YesPlan: 














Pt with improved symptoms and epigastric pain since evaluated on Friday 

evening.  We reviewed continued IVF and need for continued rehydration. We 

reviewed that dehydration/pancreatitis is slow process to recover and needs to 

be accomplished in stages.  Reviewed IVF with RN.  Pt has been recieving 150ml/

hr since yesterday.  TPN is running at 55ml/hr - increased from 45ml/hr.  She 

is doing well.








(2) Dehydration


Is this a current diagnosis for this admission?: YesPlan: 








Banana Bag and IVF ordered.  IVF adjusted and daily weight and strict I/O 

requested.  IVF D5NS to run at 150ml/hr.  Cont clear liquid diet.








(3) Hyperemesis gravidarum


Is this a current diagnosis for this admission?: YesPlan: 











Meds reviewed and IVF fluids reviewed.  Cont strict I/O.  Cont daily labs.  

PICC line in place.  








(4) Hypokalemia


Is this a current diagnosis for this admission?: YesPlan: 





Potassium now improved with TPN








(5) Protein in urine


Qualifiers: 


     Proteinuria type: other        Qualified Code(s): R80.8 - Other 

proteinuria  


Is this a current diagnosis for this admission?: YesPlan: 


Pt with reported chronic HTN although normal BPs in hospital.  24 hr UTP 

elevated which may also be related to severe dehydration.  Nephrology consult 

placed yesterday.











- Time


Time Spent with patient: 15-24 minutes


Critical Time spent with patient: Less than 15 minutes


Medications reviewed and adjusted accordingly: Yes


Anticipated discharge: Home


Within: within 72 hours, Other





- Inpatient Certification


Based on my medical assessment, after consideration of the patient's 

comorbidities, presenting symptoms, or acuity I expect that the services needed 

warrant INPATIENT care.: Yes


I certify that my determination is in accordance with my understanding of 

Medicare's requirements for reasonable and necessary INPATIENT services [42 CFR 

412.3e].: Yes


Medical Necessity: Significant Comorbidiites Make Outpatient Treatment Too Risky

, Need Close Monitoring Due to Risk of Patient Decompensation, Need For IV 

Fluids


Post Hospital Care: D/C Planner Documentation





- Plan Summary


Plan Summary: 


hme when able to reliably tolerate po solids

## 2017-06-29 LAB
ALBUMIN SERPL-MCNC: 2.8 G/DL (ref 3.5–5)
ALP SERPL-CCNC: 110 U/L (ref 38–126)
ALT SERPL-CCNC: 93 U/L (ref 9–52)
ANION GAP SERPL CALC-SCNC: 8 MMOL/L (ref 5–19)
AST SERPL-CCNC: 63 U/L (ref 14–36)
BILIRUB DIRECT SERPL-MCNC: 0.3 MG/DL (ref 0–0.4)
BILIRUB SERPL-MCNC: 0.4 MG/DL (ref 0.2–1.3)
BUN SERPL-MCNC: 3 MG/DL (ref 7–20)
CALCIUM: 8.7 MG/DL (ref 8.4–10.2)
CHLORIDE SERPL-SCNC: 105 MMOL/L (ref 98–107)
CO2 SERPL-SCNC: 23 MMOL/L (ref 22–30)
CREAT SERPL-MCNC: 0.39 MG/DL (ref 0.52–1.25)
GLUCOSE SERPL-MCNC: 128 MG/DL (ref 75–110)
PHOSPHATE SERPL-MCNC: 3.7 MG/DL (ref 2.5–4.5)
POTASSIUM SERPL-SCNC: 3.9 MMOL/L (ref 3.6–5)
PREALB SERPL-MCNC: 11.8 MG/DL (ref 17.6–36)
PROT SERPL-MCNC: 6 G/DL (ref 6.3–8.2)
SODIUM SERPL-SCNC: 135.9 MMOL/L (ref 137–145)

## 2017-06-29 RX ADMIN — DEXTROSE, SODIUM CHLORIDE, AND POTASSIUM CHLORIDE PRN ML: 5; .45; .15 INJECTION INTRAVENOUS at 10:10

## 2017-06-29 RX ADMIN — SODIUM CHLORIDE SCH ML: 9 INJECTION INTRAMUSCULAR; INTRAVENOUS; SUBCUTANEOUS at 10:16

## 2017-06-29 RX ADMIN — Medication SCH UNIT: at 10:16

## 2017-06-29 RX ADMIN — INSULIN HUMAN PRN UNIT: 100 INJECTION, SOLUTION PARENTERAL at 13:16

## 2017-06-29 RX ADMIN — LEUCINE, PHENYLALANINE, LYSINE, METHIONINE, ISOLEUCINE, VALINE, HISTIDINE, THREONINE, TRYPTOPHAN, ALANINE, GLYCINE, ARGININE, PROLINE, SERINE, TYROSINE, DEXTROSE PRN ML: 365; 280; 290; 200; 300; 290; 240; 210; 90; 1035; 515; 575; 340; 250; 20; 25 INJECTION INTRAVENOUS at 10:01

## 2017-06-29 RX ADMIN — Medication SCH UNIT: at 21:52

## 2017-06-29 RX ADMIN — SODIUM CHLORIDE SCH ML: 9 INJECTION INTRAMUSCULAR; INTRAVENOUS; SUBCUTANEOUS at 21:52

## 2017-06-29 RX ADMIN — DEXTROSE, SODIUM CHLORIDE, AND POTASSIUM CHLORIDE PRN ML: 5; .45; .15 INJECTION INTRAVENOUS at 20:22

## 2017-06-30 RX ADMIN — SODIUM CHLORIDE SCH ML: 9 INJECTION INTRAMUSCULAR; INTRAVENOUS; SUBCUTANEOUS at 10:30

## 2017-06-30 RX ADMIN — LEUCINE, PHENYLALANINE, LYSINE, METHIONINE, ISOLEUCINE, VALINE, HISTIDINE, THREONINE, TRYPTOPHAN, ALANINE, GLYCINE, ARGININE, PROLINE, SERINE, TYROSINE, DEXTROSE PRN ML: 365; 280; 290; 200; 300; 290; 240; 210; 90; 1035; 515; 575; 340; 250; 20; 25 INJECTION INTRAVENOUS at 06:05

## 2017-06-30 RX ADMIN — DEXTROSE, SODIUM CHLORIDE, AND POTASSIUM CHLORIDE PRN ML: 5; .45; .15 INJECTION INTRAVENOUS at 16:50

## 2017-06-30 RX ADMIN — Medication SCH UNIT: at 21:45

## 2017-06-30 RX ADMIN — Medication SCH UNIT: at 10:30

## 2017-06-30 RX ADMIN — INSULIN HUMAN PRN UNIT: 100 INJECTION, SOLUTION PARENTERAL at 00:09

## 2017-06-30 RX ADMIN — DEXTROSE, SODIUM CHLORIDE, AND POTASSIUM CHLORIDE PRN ML: 5; .45; .15 INJECTION INTRAVENOUS at 06:05

## 2017-06-30 RX ADMIN — SODIUM CHLORIDE SCH ML: 9 INJECTION INTRAMUSCULAR; INTRAVENOUS; SUBCUTANEOUS at 21:45

## 2017-06-30 NOTE — PDOC PROGRESS REPORT
Subjective


Progress Note for:: 06/29/17


Subjective:: 


no nausea or emesis for several days.  She is tolerating the bland diet but is 

not eating a lot of it since it does not taste good - we reviewed need to eat 

more of the bland diet so that we can advance diet.  She reports epigastric 

pain is resolved.  She overall feels better.





Physical Exam





- Physical Exam


Vital Signs: 


 











Temp Pulse Resp BP Pulse Ox


 


 98.3 F   100   16   113/67   100 


 


 06/29/17 15:18  06/29/17 15:18  06/29/17 15:18  06/29/17 15:18  06/29/17 15:18








 Intake & Output











 06/28/17 06/29/17 06/30/17





 06:59 06:59 06:59


 


Intake Total 3890 4714 560


 


Output Total 4300 4650 2800


 


Balance -410 64 -2240


 


Weight 86.7 kg 86 kg 











General appearance: PRESENT: no acute distress, well-developed, well-nourished


Head exam: PRESENT: atraumatic, normocephalic


Cardiovascular exam: PRESENT: RRR.  ABSENT: diastolic murmur, rubs, systolic 

murmur


Pulses: PRESENT: normal dorsalis pedis pul, +2 pedal pulses bilateral


GI/Abdominal exam: PRESENT: normal bowel sounds, soft.  ABSENT: distended, 

guarding, mass, organolmegaly, rebound, tenderness


Rectal exam: PRESENT: deferred


Extremities exam: PRESENT: full ROM.  ABSENT: calf tenderness, clubbing, pedal 

edema


Neurological exam: PRESENT: alert, awake, oriented to person, oriented to place

, oriented to time, oriented to situation, CN II-XII grossly intact.  ABSENT: 

motor sensory deficit


Psychiatric exam: PRESENT: appropriate affect, normal mood.  ABSENT: homicidal 

ideation, suicidal ideation


Skin exam: PRESENT: dry, intact, warm.  ABSENT: cyanosis, rash





Result


Laboratory Results: 


 





 06/25/17 09:17 





 06/29/17 06:53 





 











  06/29/17 06/29/17





  05:55 06:53


 


Sodium  Cancelled  135.9 L


 


Potassium  Cancelled  3.9


 


Chloride  Cancelled  105


 


Carbon Dioxide  Cancelled  23


 


Anion Gap  Cancelled  8


 


BUN  Cancelled  3 L


 


Creatinine  Cancelled  0.39 L


 


Est GFR ( Amer)  Cancelled  > 60


 


Est GFR (Non-Af Amer)  Cancelled  > 60


 


Glucose  Cancelled  128 H


 


Calcium  Cancelled  8.7


 


Phosphorus  Cancelled  3.7


 


Total Bilirubin  Cancelled  0.4


 


AST  Cancelled  63 H


 


ALT  Cancelled  93 H


 


Alkaline Phosphatase  Cancelled  110


 


Total Protein  Cancelled  6.0 L


 


Albumin  Cancelled  2.8 L


 


Prealbumin  Cancelled  11.8 L











Impressions: 


 





Abdomen Ultrasound  06/23/17 00:00


IMPRESSION:  There may be a some sludge in the gallbladder, the study was 

otherwise unremarkable.


 








Guidance Fluoroscopy  06/26/17 00:00


IMPRESSION:  SUCCESSFUL PLACEMENT OF A 5 FR DUAL LUMEN 37 CM PICC IN THE LEFT 

BASILIC VEIN.


 








Interventional Vascular Procedure  06/26/17 00:00


IMPRESSION:  SUCCESSFUL PLACEMENT OF A 5 FR DUAL LUMEN 37 CM PICC IN THE LEFT 

BASILIC VEIN.


 








PICC Line Insertion  06/26/17 00:00


IMPRESSION:  SUCCESSFUL PLACEMENT OF A 5 FR DUAL LUMEN 37 CM PICC IN THE LEFT 

BASILIC VEIN.


 














Assessment & Plan





- Diagnosis


(1) Pancreatitis


Qualifiers: 


     Chronicity: acute     Pancreatitis type: other


Is this a current diagnosis for this admission?: YesPlan: 

















Pt with improved symptoms and epigastric pain since evaluated on Friday 

evening.  We reviewed continued IVF and need for continued rehydration. We 

reviewed that dehydration/pancreatitis is slow process to recover and needs to 

be accomplished in stages.  Reviewed IVF with RN.  Pt has been recieving 150ml/

hr since yesterday.  TPN is running at 55ml/hr - plan to decrease if tolerates 

regular diet.  She is doing well.








(2) Dehydration


Is this a current diagnosis for this admission?: YesPlan: 











Banana Bag and IVF ordered.  IVF adjusted and daily weight and strict I/O 

requested.  IVF D5NS to run at 150ml/hr.  Increase diet to regular tomorrow if 

tolerates dinner.








(3) Hyperemesis gravidarum


Is this a current diagnosis for this admission?: YesPlan: 











Meds reviewed and IVF fluids reviewed.  Cont strict I/O.  Cont daily labs.  

PICC line in place.  








(4) Hypokalemia


Is this a current diagnosis for this admission?: YesPlan: 





Potassium now improved with TPN








(5) Protein in urine


Qualifiers: 


     Proteinuria type: other        Qualified Code(s): R80.8 - Other 

proteinuria  


Is this a current diagnosis for this admission?: YesPlan: 


Pt with reported chronic HTN although normal BPs in hospital.  24 hr UTP 

elevated which may also be related to severe dehydration.  Nephrology consult 

placed yesterday.











- Time


Time Spent with patient: 15-24 minutes


Critical Time spent with patient: Less than 15 minutes


Anticipated discharge: Home


Within: within 48 hours





- Inpatient Certification


Based on my medical assessment, after consideration of the patient's 

comorbidities, presenting symptoms, or acuity I expect that the services needed 

warrant INPATIENT care.: Yes


I certify that my determination is in accordance with my understanding of 

Medicare's requirements for reasonable and necessary INPATIENT services [42 CFR 

412.3e].: Yes


Medical Necessity: Need Close Monitoring Due to Risk of Patient Decompensation, 

Need For IV Fluids


Post Hospital Care: D/C Planner Documentation

## 2017-06-30 NOTE — PDOC PROGRESS REPORT
Subjective


Subjective:: 


Pt reports feeling better, no n/v for the past 24 hours


No vaginal bleeding





Physical Exam





- Physical Exam


Vital Signs: 


 











Temp Pulse Resp BP Pulse Ox


 


 98.4 F   107 H  16   104/71   100 


 


 06/30/17 04:16  06/30/17 04:16  06/30/17 04:16  06/30/17 04:16  06/30/17 04:16








 Intake & Output











 06/29/17 06/30/17 07/01/17





 06:59 06:59 06:59


 


Intake Total 4714 2420 


 


Output Total 4650 4200 


 


Balance 64 -1780 


 


Weight 86 kg 86.3 kg 











General appearance: PRESENT: no acute distress, cooperative, well-developed


GI/Abdominal exam: PRESENT: normal bowel sounds - Nontender abdomen, soft





Result


Laboratory Results: 


 





 06/25/17 09:17 





 06/29/17 06:53 








Impressions: 


 





Abdomen Ultrasound  06/23/17 00:00


IMPRESSION:  There may be a some sludge in the gallbladder, the study was 

otherwise unremarkable.


 








Guidance Fluoroscopy  06/26/17 00:00


IMPRESSION:  SUCCESSFUL PLACEMENT OF A 5 FR DUAL LUMEN 37 CM PICC IN THE LEFT 

BASILIC VEIN.


 








Interventional Vascular Procedure  06/26/17 00:00


IMPRESSION:  SUCCESSFUL PLACEMENT OF A 5 FR DUAL LUMEN 37 CM PICC IN THE LEFT 

BASILIC VEIN.


 








PICC Line Insertion  06/26/17 00:00


IMPRESSION:  SUCCESSFUL PLACEMENT OF A 5 FR DUAL LUMEN 37 CM PICC IN THE LEFT 

BASILIC VEIN.


 














Assessment & Plan





- Diagnosis


(1) Pancreatitis


Qualifiers: 


     Chronicity: acute     Pancreatitis type: other     Acute pancreatitis 

complication: unspecified        Qualified Code(s): K85.80 - Other acute 

pancreatitis without necrosis or infection  


Is this a current diagnosis for this admission?: Yes





(2) Dehydration


Is this a current diagnosis for this admission?: Yes





(3) Hyperemesis gravidarum


Is this a current diagnosis for this admission?: Yes








- Time


Time Spent with patient: 15-24 minutes


Medications reviewed and adjusted accordingly: Yes


Anticipated discharge: Home


Within: within 24 hours - Will continue to advance diet and wean her TPN, plan d

/c home this weekend

## 2017-07-01 VITALS — SYSTOLIC BLOOD PRESSURE: 112 MMHG | DIASTOLIC BLOOD PRESSURE: 69 MMHG

## 2017-07-01 LAB
ALBUMIN SERPL-MCNC: 3.6 G/DL (ref 3.5–5)
ALP SERPL-CCNC: 148 U/L (ref 38–126)
ALT SERPL-CCNC: 116 U/L (ref 9–52)
AMYLASE SERPL-CCNC: 260 U/L (ref 30–110)
ANION GAP SERPL CALC-SCNC: 13 MMOL/L (ref 5–19)
AST SERPL-CCNC: 75 U/L (ref 14–36)
BILIRUB DIRECT SERPL-MCNC: 0.5 MG/DL (ref 0–0.4)
BILIRUB SERPL-MCNC: 0.5 MG/DL (ref 0.2–1.3)
BUN SERPL-MCNC: 7 MG/DL (ref 7–20)
CALCIUM: 9.5 MG/DL (ref 8.4–10.2)
CHLORIDE SERPL-SCNC: 101 MMOL/L (ref 98–107)
CO2 SERPL-SCNC: 22 MMOL/L (ref 22–30)
CREAT SERPL-MCNC: 0.48 MG/DL (ref 0.52–1.25)
GLUCOSE SERPL-MCNC: 141 MG/DL (ref 75–110)
LIPASE SERPL-CCNC: 517.9 U/L (ref 23–300)
POTASSIUM SERPL-SCNC: 4.5 MMOL/L (ref 3.6–5)
PROT SERPL-MCNC: 7.3 G/DL (ref 6.3–8.2)
SODIUM SERPL-SCNC: 135.5 MMOL/L (ref 137–145)

## 2017-07-01 RX ADMIN — LEUCINE, PHENYLALANINE, LYSINE, METHIONINE, ISOLEUCINE, VALINE, HISTIDINE, THREONINE, TRYPTOPHAN, ALANINE, GLYCINE, ARGININE, PROLINE, SERINE, TYROSINE, DEXTROSE PRN ML: 365; 280; 290; 200; 300; 290; 240; 210; 90; 1035; 515; 575; 340; 250; 20; 25 INJECTION INTRAVENOUS at 03:33

## 2017-07-01 RX ADMIN — SODIUM CHLORIDE SCH ML: 9 INJECTION INTRAMUSCULAR; INTRAVENOUS; SUBCUTANEOUS at 13:15

## 2017-07-01 RX ADMIN — Medication SCH UNIT: at 13:15

## 2017-07-01 NOTE — PDOC PROGRESS REPORT
Subjective


Progress Note for:: 07/01/17


Subjective:: 


Pt had emesis x1 after cheeseburger last night. Otherwise tolerating diet. 

Denies abd pain, bleeding. Requesting discharge today.








Physical Exam





- Physical Exam


Vital Signs: 


 











Temp Pulse Resp BP Pulse Ox


 


 98.2 F   107 H  15   112/69   100 


 


 07/01/17 12:22  07/01/17 12:22  07/01/17 12:22  07/01/17 12:22  07/01/17 12:22








 Intake & Output











 06/30/17 07/01/17 07/02/17





 06:59 06:59 06:59


 


Intake Total 2420 1570 400


 


Output Total 4200 3150 700


 


Balance -1780 -1580 -300


 


Weight 86.3 kg 85.2 kg 











General appearance: PRESENT: no acute distress


GI/Abdominal exam: PRESENT: normal bowel sounds, soft.  ABSENT: distended, 

guarding, mass, organolmegaly, rebound, tenderness


Extremities exam: PRESENT: full ROM.  ABSENT: calf tenderness, clubbing, pedal 

edema





Result


Laboratory Results: 


 





 06/25/17 09:17 





 07/01/17 10:06 





 











  07/01/17





  10:06


 


Sodium  135.5 L


 


Potassium  4.5


 


Chloride  101


 


Carbon Dioxide  22


 


Anion Gap  13


 


BUN  7


 


Creatinine  0.48 L


 


Est GFR ( Amer)  > 60


 


Est GFR (Non-Af Amer)  > 60


 


Glucose  141 H


 


Calcium  9.5


 


Total Bilirubin  0.5


 


AST  75 H


 


ALT  116 H


 


Alkaline Phosphatase  148 H


 


Total Protein  7.3


 


Albumin  3.6


 


Amylase  260 H


 


Lipase  517.9 H











Impressions: 


 





Abdomen Ultrasound  06/23/17 00:00


IMPRESSION:  There may be a some sludge in the gallbladder, the study was 

otherwise unremarkable.


 








Guidance Fluoroscopy  06/26/17 00:00


IMPRESSION:  SUCCESSFUL PLACEMENT OF A 5 FR DUAL LUMEN 37 CM PICC IN THE LEFT 

BASILIC VEIN.


 








Interventional Vascular Procedure  06/26/17 00:00


IMPRESSION:  SUCCESSFUL PLACEMENT OF A 5 FR DUAL LUMEN 37 CM PICC IN THE LEFT 

BASILIC VEIN.


 








PICC Line Insertion  06/26/17 00:00


IMPRESSION:  SUCCESSFUL PLACEMENT OF A 5 FR DUAL LUMEN 37 CM PICC IN THE LEFT 

BASILIC VEIN.


 














Assessment & Plan





- Diagnosis


(1) Hypokalemia


Is this a current diagnosis for this admission?: Yes





(2) Pancreatitis


Qualifiers: 


     Chronicity: acute     Pancreatitis type: other     Acute pancreatitis 

complication: unspecified        Qualified Code(s): K85.80 - Other acute 

pancreatitis without necrosis or infection; K85.8 - Other acute pancreatitis  


Is this a current diagnosis for this admission?: Yes





(3) Hyperemesis gravidarum


Plan: 


will try stopping tpn today and recheck labs in am. Premed meals with scheduled 

reglan and give PPI daily. Possible d/c home tomorrow if labs improved and pt 

tolerating po.

## 2017-07-01 NOTE — DISCHARGE SUMMARY E
Discharge Summary



NAME: RYANNE PABLO

MRN:  [id number]        : 1995     AGE: 21Y

ADMITTED: 2017                  DISCHARGED: 2017



INDICATION FOR ADMISSION:

Hyperemesis gravidarum with hypokalemia and pancreatitis.



HOSPITAL COURSE:

The patient is a 21-year-old female who suffered from nausea and vomiting

of pregnancy who was admitted at 14 weeks and 5 days, estimated

gestational age after approximately a 20 pound weight loss.  She had lost

4 pounds in the 3 days prior to admission.  She was having epigastric pain

and inability to tolerate p.o. at the time of admission.  She had tried

multiple medications for nausea and vomiting of pregnancy prior to

admission and had numerous emergency room visits prior to this admission. 

On admission she was noted to be dehydration.  Her amylase was 320 and

lipase was 857.  Her AST and ALT were 61 and 98 respectively.  General

surgery was consulted for assistance with her management.  She had a right

upper quadrant ultrasound done which did not show gallstones.  General

surgery felt that it was okay to use her GI tract as tolerated for p.o.

intake.  During the course of her stay her amylase and lipase initially

improved.  However, with hydration her serum protein and serum albumin

were noted to be very low.  Given her weight loss and initial inability to

tolerate p.o. a PICC line was placed on 2017.  TPN was then started

at 45 mL per hour.  As the patient's amylase and lipase improved somewhat

she was allowed to continue with diet.  However, on the day prior to

leaving AM the patient had requested that all IV fluids be turned off. 

IV fluids were removed but TPN was continued at 45 mL an hour overnight. 

She had 1 episode of emesis the night before leaving.  Her AST and ALT

were slightly elevated and her amylase and lipase were 260 and 517

respectively on .  Despite this she refused further hydration and

wanted the TPN off.  We complied with this request but asked her to stay

overnight to repeat labs and assure that she could tolerate p.o.  She

declined this.  She signed [facility] paperwork and her PICC line was removed. 

She was informed that she may become hypoglycemia due to abrupt cessation

of the TPN and to call 911 should she start feeling poorly.  She was not

given discharge meds as she left against medical advice.  She did have

some antiemetics at home from before this.





DICTATING PHYSICIAN:  NEAL BUI M.D.





[id number]                  DT: 2017    1633

PHY#: [id number]            DD: 2017    1416

ID:   [id number]           JOB#: [id number]       ACCT: [id number]



cc:NEAL BUI M.D.

   NIMESH BARON M.D.

>

## 2017-07-02 ENCOUNTER — HOSPITAL ENCOUNTER (EMERGENCY)
Dept: HOSPITAL 62 - ER | Age: 22
Discharge: HOME | End: 2017-07-02
Payer: MEDICAID

## 2017-07-02 VITALS — SYSTOLIC BLOOD PRESSURE: 118 MMHG | DIASTOLIC BLOOD PRESSURE: 66 MMHG

## 2017-07-02 DIAGNOSIS — O21.9: Primary | ICD-10-CM

## 2017-07-02 DIAGNOSIS — Z3A.00: ICD-10-CM

## 2017-07-02 DIAGNOSIS — Z87.19: ICD-10-CM

## 2017-07-02 LAB
ALBUMIN SERPL-MCNC: 3.2 G/DL (ref 3.5–5)
ALP SERPL-CCNC: 143 U/L (ref 38–126)
ALT SERPL-CCNC: 112 U/L (ref 9–52)
ANION GAP SERPL CALC-SCNC: 10 MMOL/L (ref 5–19)
APPEARANCE UR: CLEAR
AST SERPL-CCNC: 91 U/L (ref 14–36)
BASOPHILS # BLD AUTO: 0 10^3/UL (ref 0–0.2)
BASOPHILS NFR BLD AUTO: 0.5 % (ref 0–2)
BILIRUB DIRECT SERPL-MCNC: 0.4 MG/DL (ref 0–0.4)
BILIRUB SERPL-MCNC: 0.5 MG/DL (ref 0.2–1.3)
BILIRUB UR QL STRIP: NEGATIVE
BUN SERPL-MCNC: 7 MG/DL (ref 7–20)
CALCIUM: 9 MG/DL (ref 8.4–10.2)
CHLORIDE SERPL-SCNC: 103 MMOL/L (ref 98–107)
CO2 SERPL-SCNC: 22 MMOL/L (ref 22–30)
CREAT SERPL-MCNC: 0.46 MG/DL (ref 0.52–1.25)
EOSINOPHIL # BLD AUTO: 0 10^3/UL (ref 0–0.6)
EOSINOPHIL NFR BLD AUTO: 0.3 % (ref 0–6)
ERYTHROCYTE [DISTWIDTH] IN BLOOD BY AUTOMATED COUNT: 13 % (ref 11.5–14)
ETHANOL SERPL-MCNC: < 10 MG/DL
GLUCOSE SERPL-MCNC: 84 MG/DL (ref 75–110)
GLUCOSE UR STRIP-MCNC: NEGATIVE MG/DL
HCT VFR BLD CALC: 33.7 % (ref 36–47)
HGB BLD-MCNC: 11.3 G/DL (ref 12–15.5)
HGB HCT DIFFERENCE: 0.2
KETONES UR STRIP-MCNC: 20 MG/DL
LIPASE SERPL-CCNC: 382.8 U/L (ref 23–300)
LYMPHOCYTES # BLD AUTO: 1.5 10^3/UL (ref 0.5–4.7)
LYMPHOCYTES NFR BLD AUTO: 19.4 % (ref 13–45)
MAGNESIUM SERPL-MCNC: 1.6 MG/DL (ref 1.6–2.3)
MCH RBC QN AUTO: 29 PG (ref 27–33.4)
MCHC RBC AUTO-ENTMCNC: 33.5 G/DL (ref 32–36)
MCV RBC AUTO: 87 FL (ref 80–97)
MONOCYTES # BLD AUTO: 0.7 10^3/UL (ref 0.1–1.4)
MONOCYTES NFR BLD AUTO: 9.1 % (ref 3–13)
NEUTROPHILS # BLD AUTO: 5.5 10^3/UL (ref 1.7–8.2)
NEUTS SEG NFR BLD AUTO: 70.7 % (ref 42–78)
NITRITE UR QL STRIP: NEGATIVE
PH UR STRIP: 5 [PH] (ref 5–9)
POTASSIUM SERPL-SCNC: 4.3 MMOL/L (ref 3.6–5)
PROT SERPL-MCNC: 6.6 G/DL (ref 6.3–8.2)
PROT UR STRIP-MCNC: NEGATIVE MG/DL
RBC # BLD AUTO: 3.89 10^6/UL (ref 3.72–5.28)
SODIUM SERPL-SCNC: 134.8 MMOL/L (ref 137–145)
SP GR UR STRIP: 1.01
UROBILINOGEN UR-MCNC: NEGATIVE MG/DL (ref ?–2)
WBC # BLD AUTO: 7.7 10^3/UL (ref 4–10.5)

## 2017-07-02 PROCEDURE — 96360 HYDRATION IV INFUSION INIT: CPT

## 2017-07-02 PROCEDURE — 83690 ASSAY OF LIPASE: CPT

## 2017-07-02 PROCEDURE — 81001 URINALYSIS AUTO W/SCOPE: CPT

## 2017-07-02 PROCEDURE — 84702 CHORIONIC GONADOTROPIN TEST: CPT

## 2017-07-02 PROCEDURE — 36415 COLL VENOUS BLD VENIPUNCTURE: CPT

## 2017-07-02 PROCEDURE — 80053 COMPREHEN METABOLIC PANEL: CPT

## 2017-07-02 PROCEDURE — 83735 ASSAY OF MAGNESIUM: CPT

## 2017-07-02 PROCEDURE — 85025 COMPLETE CBC W/AUTO DIFF WBC: CPT

## 2017-07-02 PROCEDURE — 99284 EMERGENCY DEPT VISIT MOD MDM: CPT

## 2017-07-02 PROCEDURE — 80307 DRUG TEST PRSMV CHEM ANLYZR: CPT

## 2017-07-02 NOTE — ER DOCUMENT REPORT
ED General





- General


TRAVEL OUTSIDE OF THE [place] IN LAST 30 DAYS: No





- HPI


Patient complains to provider of: Vomiting





<NERISSA LORENZO - Last Filed: 07/02/17 05:44>





<HETAL SHARP - Last Filed: 07/02/17 07:12>





- General


Chief Complaint: Vomiting


Stated Complaint: VOMITING


Time Seen by Provider: 07/02/17 04:02





- HPI


Notes: 


Patient coming in for vomiting.  Patient recently signed out AGAINST MEDICAL 

ADVICE from the labor and delivery floor after being diagnosed with hyperemesis 

gravidarum and pancreatitis.  Patient during a time was receiving TPA however 

according to the patient had family issues before left patient states she 

vomited 3 times upon arriving home therefore he returned here.  According to 

the pivot nurse patient also signed out AGAINST MEDICAL ADVICE and went to [facility] and was discharged later however patient cannot corroborate the story.  

Upon my evaluation patient is lying comfortably with the blanket pulled over 

her head.  Patient is asleep easily arousable.  Patient denies any abdominal 

pain fevers chills diarrhea. (NERISSA LORENZO)





- Related Data


Allergies/Adverse Reactions: 


 





No Known Allergies Allergy (Verified 06/12/17 15:16)


 











Past Medical History





- Social History


Smoking Status: Unknown if Ever Smoked


Family History: Reviewed & Not Pertinent


Patient has suicidal ideation: No


Patient has homicidal ideation: No





- Past Medical History


Cardiac Medical History: Reports: Hx Hypertension - current pregnancy only


   Denies: Hx Congestive Heart Failure, Hx Heart Attack, Hx Pulmonary Embolism, 

Hx Heart Murmur


Pulmonary Medical History: 


   Denies: Hx Asthma, Hx Bronchitis, Hx COPD, Hx Pneumonia, Hx Sleep Apnea, Hx 

Tuberculosis


Neurological Medical History: Denies: Hx Cerebrovascular Accident, Hx Seizures


Endocrine Medical History: Denies: Hx Hyperthyroidism, Hx Hypothyroidism


Renal/ Medical History: Denies: Hx End Stage Renal Disease, Hx Kidney Stones, 

Hx Ovarian Cysts, Hx Peritoneal Dialysis, Hx Pelvic Inflammatory Disease


Malignancy Medical History: Denies: Hx Breast Cancer, Hx Cervical Cancer, Hx 

Ovarian Cancer


GI Medical History: Denies: Hx Cirrhosis, Hx Gastroesophageal Reflux Disease, 

Hx Hiatal Hernia, Hx Ulcer


Musculoskeltal Medical History: Denies Hx Arthritis, Denies Hx Fibromyalgia, 

Denies Hx Multiple Sclerosis


Psychiatric Medical History: 


   Denies: Hx Bipolar Disorder, Hx Depression, Hx Post Traumatic Stress Disorder

, Hx Schizophrenia


Traumatic Medical History: Denies: Hx Fractures


Infectious Medical History: Denies: Hx HIV





- Immunizations


Hx Diphtheria, Pertussis, Tetanus Vaccination: No - unknown





<NERISSA LORENZO - Last Filed: 07/02/17 05:44>





Review of Systems





- Review of Systems


Constitutional: No symptoms reported


EENT: No symptoms reported


Cardiovascular: No symptoms reported


Respiratory: No symptoms reported


Gastrointestinal: Nausea, Vomiting


Genitourinary: No symptoms reported


Female Genitourinary: No symptoms reported


Musculoskeletal: No symptoms reported


Skin: No symptoms reported


Hematologic/Lymphatic: No symptoms reported


Neurological/Psychological: No symptoms reported


-: Yes All other systems reviewed and negative





<NERISSA LORENZO - Last Filed: 07/02/17 05:44>





Physical Exam





- Vital signs


Interpretation: Normal





- General


General appearance: Appears well, Alert





- HEENT


Head: Normocephalic, Atraumatic


Eyes: Normal


Pupils: PERRL





- Respiratory


Respiratory status: No respiratory distress


Chest status: Nontender


Breath sounds: Normal


Chest palpation: Normal





- Cardiovascular


Rhythm: Regular


Heart sounds: Normal auscultation


Murmur: No





- Abdominal


Inspection: Normal


Distension: No distension


Bowel sounds: Normal


Tenderness: Nontender


Organomegaly: No organomegaly





- Back


Back: Normal, Nontender





- Extremities


General upper extremity: Normal inspection, Nontender, Normal color, Normal ROM

, Normal temperature


General lower extremity: Normal inspection, Nontender, Normal color, Normal ROM

, Normal temperature, Normal weight bearing.  No: Shady's sign





- Neurological


Neuro grossly intact: Yes


Cognition: Normal


Orientation: AAOx4


[place] Coma Scale Eye Opening: Spontaneous


[place]z Coma Scale Verbal: Oriented


Tez Coma Scale Motor: Obeys Commands


[place] Coma Scale Total: 15


Speech: Normal


Motor strength normal: LUE, RUE, LLE, RLE


Sensory: Normal





- Psychological


Associated symptoms: Normal affect, Normal mood





- Skin


Skin Temperature: Warm


Skin Moisture: Dry


Skin Color: Normal





<NERISSA LORENZO - Last Filed: 07/02/17 05:44>





Course





- Laboratory


Result Diagrams: 


 07/02/17 05:23





 07/02/17 05:23





<NERISSA LORENZO - Last Filed: 07/02/17 05:44>





- Laboratory


Result Diagrams: 


 07/02/17 05:23





 07/02/17 05:23





<HETAL SHARP - Last Filed: 07/02/17 07:12>





- Re-evaluation


Re-evalutation: 





07/02/17 05:45


Bedside ultrasound showed fetal heart tones at 162.  Bedside ultrasound IV 

access was obtained patient will be given IV fluids.  Patient has been here for 

approximately 2 hours no vomiting and no antiemetics given.  Patient does not 

look to be severely dehydrated currently waiting on lab results with lab 

results return will consult with OB/GYN as the patient was just recently signed 

out AGAINST MEDICAL ADVICE from their service for further guidance. (NERISSA LORENZO)








07/02/17 07:12


Patient has been reevaluated is stable, her lipase level has improved, I spoke 

with Dr. Early he agrees with discharge After performing a Medical Screening 

Examination, I estimate there is LOW risk for ACUTE APPENDICITIS, BOWEL 

OBSTRUCTION, ACUTE CHOLECYSTITIS, PERFORATED DIVERTICULITIS, INCARCERATED HERNIA

,  PELVIC INFLAMMATORY DISEASE, PERFORATED ULCER, ECTOPIC PREGNANCY, or TUBO-

OVARIAN ABSCESS, thus I consider the discharge disposition reasonable. Also, 

there is no evidence or peritonitis, sepsis, or toxicity. I have reevaluated 

this patient multiple times and no significant life threatening changes are 

noted. The patient and I have discussed the diagnosis and risks, and we agree 

with discharging home with close follow-up with the understanding that symptoms 

and presentations can change. We also discussed returning to the Emergency 

Department immediately if new or worsening symptoms occur. We have discussed 

the symptoms which are most concerning (e.g., bloody stool, fever, changing or 

worsening pain, vomiting) that necessitate immediate return.


 (HETAL SHARP)





- Vital Signs


Vital signs: 





 











Temp Pulse Resp BP Pulse Ox


 


 98.7 F   107 H  20   115/70   97 


 


 07/02/17 03:23  07/02/17 03:23  07/02/17 07:00  07/02/17 06:31  07/02/17 07:00














- Laboratory


Laboratory results interpreted by me: 





 











  07/02/17 07/02/17 07/02/17





  05:23 05:23 05:23


 


Hgb  11.3 L  


 


Hct  33.7 L  


 


Sodium   134.8 L 


 


Creatinine   0.46 L 


 


AST   91 H 


 


ALT   112 H 


 


Alkaline Phosphatase   143 H 


 


Albumin   3.2 L 


 


Lipase   382.8 H 


 


Beta HCG, Quant   82496.00 H 


 


Urine Ketones    20 H














Procedures





- Additional Procedures


  ** Fetal heart tones


Additional Procedures: IV insertion - Bedside ultrasound guided IV was 

performed the skin was cleaned with alcohol swabs just proximal to the 

antecubital region a 20-gauge IV was established with good blood return was 

sample was obtained flush was performed with no signs of extravasation patient 

tolerated well no complications





<NERISSA LORENZO - Last Filed: 07/02/17 05:44>





<HETAL SHARP - Last Filed: 07/02/17 07:12>





- Additional Procedures


  ** Fetal heart tones


Notes: 





07/02/17 05:52


Patient coming in pregnant.  Bedside ultrasound showed fetal heart tones at 162 

with positive fetal movement (NERISSA LORENZO)





Discharge





<NERISSA LORENZO - Last Filed: 07/02/17 05:44>





<HETAL SHARP - Last Filed: 07/02/17 07:12>





- Discharge


Clinical Impression: 


 Vomiting complicating pregnancy





Condition: Good


Instructions:  Vomiting ([facility])


Additional Instructions: 


For nausea and vomiting during pregnancy I recomment:


Start with 10-12.5 mg of pyridoxine (vitamin B6) three times a day for 2 days. 

If not fully effective,


Increase to 12.5 mg of pyridoxine four times a day for 2 days. If not fully 

effective,


Increase to 25 mg of pyridoxine three times a day for 2 days. If not fully 

effective,


Continue 25 mg pyridoxine 3 times a day, and add 12.5 mg of doxylamine before 

bedtime each day for 2 days. If not fully effective,


Continue 25 mg pyridoxine 3 times a day, and take 12.5 mg of doxylamine twice a 

day. If not fully effective,


Continue 25 mg pyridoxine 3 times a day, and take 12.5 mg of doxylamine three 

times a day. If not fully effective,


Continue 25 mg pyridoxine 3 times a day, and 12.5 mg of doxylamine 3 times a day

, while adding Emetrol, one to two tablespoons (15-30 cc) taken once or twice a 

day as needed. (Emetrol is an over-the-counter mixture of sugar syrups and 

phosphoric acid [phosphorylated carbohydrate solution]) that acts by soothing 

the actual wall of the gastrointestinal tract). If not fully effective,


Consult with your doctor.


Prescriptions: 


Metoclopramide HCl [Reglan] 5 mg PO Q6 #20 tablet


Referrals: 


NEAL BUI MD [Primary Care Provider] - Follow up as needed

## 2019-11-05 ENCOUNTER — HOSPITAL ENCOUNTER (EMERGENCY)
Dept: HOSPITAL 62 - ER | Age: 24
Discharge: HOME | End: 2019-11-05
Payer: MEDICAID

## 2019-11-05 VITALS — SYSTOLIC BLOOD PRESSURE: 102 MMHG | DIASTOLIC BLOOD PRESSURE: 64 MMHG

## 2019-11-05 DIAGNOSIS — R51: ICD-10-CM

## 2019-11-05 DIAGNOSIS — M25.562: ICD-10-CM
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
## 2019-11-05 DIAGNOSIS — S39.012A: Primary | ICD-10-CM

## 2019-11-05 DIAGNOSIS — I10: ICD-10-CM

## 2019-11-05 PROCEDURE — 99284 EMERGENCY DEPT VISIT MOD MDM: CPT

## 2019-11-05 PROCEDURE — 72110 X-RAY EXAM L-2 SPINE 4/>VWS: CPT
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
